# Patient Record
Sex: FEMALE | Race: BLACK OR AFRICAN AMERICAN | NOT HISPANIC OR LATINO | Employment: UNEMPLOYED | URBAN - METROPOLITAN AREA
[De-identification: names, ages, dates, MRNs, and addresses within clinical notes are randomized per-mention and may not be internally consistent; named-entity substitution may affect disease eponyms.]

---

## 2017-04-21 ENCOUNTER — LAB VISIT (OUTPATIENT)
Dept: LAB | Facility: HOSPITAL | Age: 37
End: 2017-04-21
Attending: FAMILY MEDICINE
Payer: MEDICAID

## 2017-04-21 ENCOUNTER — OFFICE VISIT (OUTPATIENT)
Dept: FAMILY MEDICINE | Facility: HOSPITAL | Age: 37
End: 2017-04-21
Payer: MEDICAID

## 2017-04-21 VITALS
HEART RATE: 61 BPM | BODY MASS INDEX: 41.95 KG/M2 | DIASTOLIC BLOOD PRESSURE: 75 MMHG | WEIGHT: 293 LBS | HEIGHT: 70 IN | SYSTOLIC BLOOD PRESSURE: 137 MMHG

## 2017-04-21 DIAGNOSIS — I10 ESSENTIAL HYPERTENSION: ICD-10-CM

## 2017-04-21 DIAGNOSIS — Z00.00 HEALTH CARE MAINTENANCE: ICD-10-CM

## 2017-04-21 DIAGNOSIS — I10 ESSENTIAL HYPERTENSION: Primary | ICD-10-CM

## 2017-04-21 LAB
ALBUMIN SERPL BCP-MCNC: 3.7 G/DL
ALP SERPL-CCNC: 69 U/L
ALT SERPL W/O P-5'-P-CCNC: 10 U/L
ANION GAP SERPL CALC-SCNC: 9 MMOL/L
AST SERPL-CCNC: 9 U/L
BASOPHILS # BLD AUTO: 0.02 K/UL
BASOPHILS NFR BLD: 0.2 %
BILIRUB SERPL-MCNC: 0.4 MG/DL
BUN SERPL-MCNC: 11 MG/DL
CALCIUM SERPL-MCNC: 9.5 MG/DL
CHLORIDE SERPL-SCNC: 107 MMOL/L
CHOLEST/HDLC SERPL: 5.4 {RATIO}
CO2 SERPL-SCNC: 22 MMOL/L
CREAT SERPL-MCNC: 0.9 MG/DL
DIFFERENTIAL METHOD: ABNORMAL
EOSINOPHIL # BLD AUTO: 0.1 K/UL
EOSINOPHIL NFR BLD: 1.2 %
ERYTHROCYTE [DISTWIDTH] IN BLOOD BY AUTOMATED COUNT: 14 %
EST. GFR  (AFRICAN AMERICAN): >60 ML/MIN/1.73 M^2
EST. GFR  (NON AFRICAN AMERICAN): >60 ML/MIN/1.73 M^2
GLUCOSE SERPL-MCNC: 91 MG/DL
HCT VFR BLD AUTO: 34.6 %
HDL/CHOLESTEROL RATIO: 18.5 %
HDLC SERPL-MCNC: 233 MG/DL
HDLC SERPL-MCNC: 43 MG/DL
HGB BLD-MCNC: 11.4 G/DL
LDLC SERPL CALC-MCNC: 158.2 MG/DL
LYMPHOCYTES # BLD AUTO: 3.3 K/UL
LYMPHOCYTES NFR BLD: 38.9 %
MCH RBC QN AUTO: 30.3 PG
MCHC RBC AUTO-ENTMCNC: 32.9 %
MCV RBC AUTO: 92 FL
MONOCYTES # BLD AUTO: 0.6 K/UL
MONOCYTES NFR BLD: 7.4 %
NEUTROPHILS # BLD AUTO: 4.4 K/UL
NEUTROPHILS NFR BLD: 52.2 %
NONHDLC SERPL-MCNC: 190 MG/DL
PLATELET # BLD AUTO: 325 K/UL
PMV BLD AUTO: 10.2 FL
POTASSIUM SERPL-SCNC: 3.8 MMOL/L
PROT SERPL-MCNC: 7.7 G/DL
RBC # BLD AUTO: 3.76 M/UL
SODIUM SERPL-SCNC: 138 MMOL/L
TRIGL SERPL-MCNC: 159 MG/DL
TSH SERPL DL<=0.005 MIU/L-ACNC: 1.7 UIU/ML
WBC # BLD AUTO: 8.36 K/UL

## 2017-04-21 PROCEDURE — 99213 OFFICE O/P EST LOW 20 MIN: CPT | Performed by: FAMILY MEDICINE

## 2017-04-21 PROCEDURE — 80053 COMPREHEN METABOLIC PANEL: CPT

## 2017-04-21 PROCEDURE — 83036 HEMOGLOBIN GLYCOSYLATED A1C: CPT

## 2017-04-21 PROCEDURE — 36415 COLL VENOUS BLD VENIPUNCTURE: CPT

## 2017-04-21 PROCEDURE — 80061 LIPID PANEL: CPT

## 2017-04-21 PROCEDURE — 85025 COMPLETE CBC W/AUTO DIFF WBC: CPT

## 2017-04-21 PROCEDURE — 84443 ASSAY THYROID STIM HORMONE: CPT

## 2017-04-21 RX ORDER — LISINOPRIL 20 MG/1
20 TABLET ORAL DAILY
Qty: 30 TABLET | Refills: 11 | Status: SHIPPED | OUTPATIENT
Start: 2017-04-21 | End: 2018-08-08 | Stop reason: SDUPTHER

## 2017-04-21 RX ORDER — HYDROCHLOROTHIAZIDE 12.5 MG/1
12.5 TABLET ORAL DAILY
Qty: 30 TABLET | Refills: 11 | Status: SHIPPED | OUTPATIENT
Start: 2017-04-21 | End: 2018-08-08 | Stop reason: SDUPTHER

## 2017-04-21 RX ORDER — AMLODIPINE BESYLATE 5 MG/1
5 TABLET ORAL DAILY
Qty: 30 TABLET | Refills: 11 | Status: SHIPPED | OUTPATIENT
Start: 2017-04-21 | End: 2018-08-08 | Stop reason: SDUPTHER

## 2017-04-21 NOTE — PROGRESS NOTES
Subjective:       Patient ID: Dirk Issa is a 37 y.o. female.    Chief Complaint: Follow-up    HPI   Pt is a obese 36 yo F with hx of HTN who presents to the clinic for a refills on her BP meds. Pt states that she ran out of all her meds this morning but is compliant with all her medications otherwise. She denies any other issues of f/c/cp/sob and abd pain.     Review of Systems   Constitutional: Negative for chills, fatigue and fever.   HENT: Negative for sneezing and sore throat.    Eyes: Negative for pain.   Respiratory: Negative for cough, chest tightness, shortness of breath and wheezing.    Cardiovascular: Negative for chest pain and palpitations.   Gastrointestinal: Negative for abdominal pain, constipation, diarrhea, nausea and vomiting.   Genitourinary: Negative for dysuria.   Musculoskeletal: Negative for back pain.   Skin: Negative for rash.   Neurological: Negative for headaches.   Psychiatric/Behavioral: Negative for behavioral problems.       Objective:      Vitals:    04/21/17 1106   BP: 137/75   Pulse: 61     Physical Exam   Constitutional: She is oriented to person, place, and time. She appears well-developed and well-nourished.   Obese.    HENT:   Head: Normocephalic and atraumatic.   Eyes: Conjunctivae are normal. Pupils are equal, round, and reactive to light.   Neck: Normal range of motion.   Cardiovascular: Normal rate, normal heart sounds and intact distal pulses.    Pulmonary/Chest: Effort normal and breath sounds normal. She has no wheezes. She exhibits no tenderness.   Abdominal: Soft. Bowel sounds are normal. There is no tenderness.   Neurological: She is alert and oriented to person, place, and time.   Skin: Skin is warm.   Psychiatric: She has a normal mood and affect.       Assessment:       1. Essential hypertension    2. Health care maintenance        Plan:       Essential hypertension  -     amlodipine (NORVASC) 5 MG tablet; Take 1 tablet (5 mg total) by mouth once daily.   Dispense: 30 tablet; Refill: 11  -     hydrochlorothiazide (HYDRODIURIL) 12.5 MG Tab; Take 1 tablet (12.5 mg total) by mouth once daily.  Dispense: 30 tablet; Refill: 11  -     lisinopril (PRINIVIL,ZESTRIL) 20 MG tablet; Take 1 tablet (20 mg total) by mouth once daily.  Dispense: 30 tablet; Refill: 11  -     CBC auto differential; Future; Expected date: 4/21/17  -     Comprehensive metabolic panel; Future; Expected date: 4/21/17  -     TSH; Future; Expected date: 4/21/17  -     Hemoglobin A1c; Future; Expected date: 4/21/17  -     Lipid panel; Future; Expected date: 4/21/17    Health care maintenance  -     CBC auto differential; Future; Expected date: 4/21/17  -     Comprehensive metabolic panel; Future; Expected date: 4/21/17  -     TSH; Future; Expected date: 4/21/17  -     Hemoglobin A1c; Future; Expected date: 4/21/17  -     Lipid panel; Future; Expected date: 4/21/17  - Pap smear due next yr in 2018      Return in about 3 months (around 7/21/2017).

## 2017-04-22 LAB
ESTIMATED AVG GLUCOSE: 117 MG/DL
HBA1C MFR BLD HPLC: 5.7 %

## 2017-04-24 ENCOUNTER — DOCUMENTATION ONLY (OUTPATIENT)
Dept: FAMILY MEDICINE | Facility: HOSPITAL | Age: 37
End: 2017-04-24

## 2017-04-24 NOTE — PROGRESS NOTES
Pt called and new labs reviewed. LP elevated. Will advise on diet and exercise for now. Will plan to recheck. All questions were answered.     Denis Hidalgo

## 2017-07-09 ENCOUNTER — HOSPITAL ENCOUNTER (EMERGENCY)
Facility: HOSPITAL | Age: 37
Discharge: HOME | End: 2017-07-09
Attending: EMERGENCY MEDICINE
Payer: MEDICAID

## 2017-07-09 VITALS
DIASTOLIC BLOOD PRESSURE: 96 MMHG | HEART RATE: 66 BPM | TEMPERATURE: 98 F | SYSTOLIC BLOOD PRESSURE: 148 MMHG | BODY MASS INDEX: 41.95 KG/M2 | HEIGHT: 70 IN | OXYGEN SATURATION: 99 % | WEIGHT: 293 LBS | RESPIRATION RATE: 19 BRPM

## 2017-07-09 DIAGNOSIS — R55 NEAR SYNCOPE: ICD-10-CM

## 2017-07-09 LAB
ALBUMIN SERPL BCP-MCNC: 3.7 G/DL
ALP SERPL-CCNC: 77 U/L
ALT SERPL W/O P-5'-P-CCNC: 9 U/L
ANION GAP SERPL CALC-SCNC: 10 MMOL/L
AST SERPL-CCNC: 12 U/L
BASOPHILS # BLD AUTO: 0.03 K/UL
BASOPHILS NFR BLD: 0.5 %
BILIRUB SERPL-MCNC: 0.3 MG/DL
BUN SERPL-MCNC: 9 MG/DL
CALCIUM SERPL-MCNC: 9.5 MG/DL
CHLORIDE SERPL-SCNC: 107 MMOL/L
CO2 SERPL-SCNC: 21 MMOL/L
CREAT SERPL-MCNC: 0.9 MG/DL
DIFFERENTIAL METHOD: ABNORMAL
EOSINOPHIL # BLD AUTO: 0.1 K/UL
EOSINOPHIL NFR BLD: 1.4 %
ERYTHROCYTE [DISTWIDTH] IN BLOOD BY AUTOMATED COUNT: 13.5 %
EST. GFR  (AFRICAN AMERICAN): >60 ML/MIN/1.73 M^2
EST. GFR  (NON AFRICAN AMERICAN): >60 ML/MIN/1.73 M^2
GLUCOSE SERPL-MCNC: 99 MG/DL
HCT VFR BLD AUTO: 34 %
HGB BLD-MCNC: 11 G/DL
LYMPHOCYTES # BLD AUTO: 2.5 K/UL
LYMPHOCYTES NFR BLD: 39.2 %
MAGNESIUM SERPL-MCNC: 2.1 MG/DL
MCH RBC QN AUTO: 30.1 PG
MCHC RBC AUTO-ENTMCNC: 32.4 %
MCV RBC AUTO: 93 FL
MONOCYTES # BLD AUTO: 0.5 K/UL
MONOCYTES NFR BLD: 8 %
NEUTROPHILS # BLD AUTO: 3.2 K/UL
NEUTROPHILS NFR BLD: 50.7 %
PLATELET # BLD AUTO: 284 K/UL
PMV BLD AUTO: 9.9 FL
POTASSIUM SERPL-SCNC: 4.2 MMOL/L
PROT SERPL-MCNC: 7.1 G/DL
RBC # BLD AUTO: 3.66 M/UL
SODIUM SERPL-SCNC: 138 MMOL/L
TROPONIN I SERPL DL<=0.01 NG/ML-MCNC: 0.03 NG/ML
WBC # BLD AUTO: 6.36 K/UL

## 2017-07-09 PROCEDURE — 99284 EMERGENCY DEPT VISIT MOD MDM: CPT

## 2017-07-09 PROCEDURE — 93005 ELECTROCARDIOGRAM TRACING: CPT

## 2017-07-09 PROCEDURE — 84484 ASSAY OF TROPONIN QUANT: CPT

## 2017-07-09 PROCEDURE — 85025 COMPLETE CBC W/AUTO DIFF WBC: CPT

## 2017-07-09 PROCEDURE — 80053 COMPREHEN METABOLIC PANEL: CPT

## 2017-07-09 PROCEDURE — 83735 ASSAY OF MAGNESIUM: CPT

## 2017-07-09 NOTE — ED NOTES
Pt refusing urine specimen at this time and states she does not need a UPT. Pt also on menstrual cycle. Sherin and MD notified.

## 2017-07-09 NOTE — ED PROVIDER NOTES
Encounter Date: 7/9/2017       History     Chief Complaint   Patient presents with    Altered Mental Status     pt had near syncopal episode pta. denies +nausea denies vomiting, no chest pain but palpitations.     Patient is a 37-year-old female who said she felt weak and lightheaded at home today.  She says she felt as though she was going to pass out, but did not.  She has a history of hypertension but did not take her medications morning until after her symptoms.  She also felt palpitations but had no chest pain or shortness of breath.  No nausea or vomiting.  She has a mild headache but says this is normal for her when she is on her menses.      The history is provided by the patient.     Review of patient's allergies indicates:  No Known Allergies  Past Medical History:   Diagnosis Date    Hypertension      History reviewed. No pertinent surgical history.  Family History   Problem Relation Age of Onset    Hypertension Maternal Grandmother     Hypertension Maternal Grandfather      Social History   Substance Use Topics    Smoking status: Never Smoker    Smokeless tobacco: Never Used    Alcohol use No     Review of Systems   Constitutional: Negative for chills and fever.   HENT: Negative for congestion.    Eyes: Negative for visual disturbance.   Respiratory: Negative for chest tightness and shortness of breath.    Cardiovascular: Positive for palpitations. Negative for chest pain.   Gastrointestinal: Negative for abdominal pain, diarrhea and vomiting.   Neurological: Positive for light-headedness. Negative for syncope.   All other systems reviewed and are negative.      Physical Exam     Initial Vitals [07/09/17 1511]   BP Pulse Resp Temp SpO2   (!) 191/89 70 18 98 °F (36.7 °C) 98 %      MAP       123         Physical Exam    Nursing note and vitals reviewed.  Constitutional: She appears well-developed and well-nourished.   HENT:   Head: Normocephalic and atraumatic.   Eyes: Conjunctivae and EOM are  "normal. Pupils are equal, round, and reactive to light.   Neck: Normal range of motion. Neck supple.   Cardiovascular: Normal rate and regular rhythm.   Pulmonary/Chest: Breath sounds normal.   Abdominal: Soft. There is no tenderness. There is no rebound and no guarding.   Musculoskeletal: Normal range of motion.   Neurological: She is alert and oriented to person, place, and time. She has normal strength.   Skin: Skin is warm and dry.   Psychiatric: She has a normal mood and affect.         ED Course   Procedures  Labs Reviewed   CBC W/ AUTO DIFFERENTIAL - Abnormal; Notable for the following:        Result Value    RBC 3.66 (*)     Hemoglobin 11.0 (*)     Hematocrit 34.0 (*)     All other components within normal limits   COMPREHENSIVE METABOLIC PANEL - Abnormal; Notable for the following:     CO2 21 (*)     ALT 9 (*)     All other components within normal limits   TROPONIN I - Abnormal; Notable for the following:     Troponin I 0.029 (*)     All other components within normal limits   MAGNESIUM     Imaging Results          X-Ray Chest 1 View (Final result)  Result time 07/09/17 15:55:32    Final result by Kalani Zelaya MD (07/09/17 15:55:32)                 Impression:     No detrimental change since 3/16/16.      Electronically signed by: KALANI ZELAYA MD  Date:     07/09/17  Time:    15:55              Narrative:    XR CHEST 1 VIEW.  Clinical History provided for exam:"near syncope". The cardiac silhouette is  enlarged.  Pulmonary vascularity is not increased.  The lungs are free of lobar consolidation and alveolar edema.  There is no large pleural effusion or pneumothorax.  Visualized osseous structures are stable.                              EKG Readings: (Independently Interpreted)   Rhythm: Normal Sinus Rhythm. Heart Rate: 64. ST Segments: Normal ST Segments. T Waves Flipped: V3, V4, V5 and V6.          Medical Decision Making:   Clinical Tests:   Lab Tests: Ordered and Reviewed  Radiological Study: " Ordered and Reviewed  ED Management:  37-year-old female who had a near syncopal episode at home.  Workup here in the ED is unremarkable.  She has a very mild elevation of her troponin which, in reviewing prior lab work, appears to be chronic.  Her initial systolic blood pressures in the 190s.  Patient had taken her blood pressure medicine prior to arrival.  Pressure was noted to be reduced into the 140s.  She has remained asymptomatic while in the ED.  She was discharged in stable condition to follow-up with her primary physician as soon as able for recheck and further treatment as warranted.  She'll also return here for any returning symptoms occurring prior to follow-up.                   ED Course     Clinical Impression:   The encounter diagnosis was Near syncope.                           Pasha Mccormick MD  07/09/17 1584

## 2017-07-09 NOTE — ED NOTES
"Pt presents to ED with c/o near syncope a few hours pta. Pt states she did not take her BP meds this morning bc she felt bad. She was not doing any out of the ordinary and felt dizzy and weak. Pt said her mom told her to drink 2 "shots" of vinegar and starting feeling worse afterwards. Pt also c/o mild headache but states that she always gets them on her menstrual cycle. Pt AAO. NAD noted. Placed on cardiac monitor, bp cuff and continuous pulse ox.   "

## 2018-08-08 ENCOUNTER — OFFICE VISIT (OUTPATIENT)
Dept: FAMILY MEDICINE | Facility: HOSPITAL | Age: 38
End: 2018-08-08
Attending: FAMILY MEDICINE
Payer: MEDICAID

## 2018-08-08 VITALS
DIASTOLIC BLOOD PRESSURE: 116 MMHG | BODY MASS INDEX: 44.41 KG/M2 | SYSTOLIC BLOOD PRESSURE: 197 MMHG | HEART RATE: 74 BPM | HEIGHT: 68 IN | WEIGHT: 293 LBS

## 2018-08-08 DIAGNOSIS — I16.0 HYPERTENSIVE URGENCY: Primary | ICD-10-CM

## 2018-08-08 DIAGNOSIS — I10 ESSENTIAL HYPERTENSION: ICD-10-CM

## 2018-08-08 PROCEDURE — 99213 OFFICE O/P EST LOW 20 MIN: CPT | Performed by: STUDENT IN AN ORGANIZED HEALTH CARE EDUCATION/TRAINING PROGRAM

## 2018-08-08 RX ORDER — HYDROCHLOROTHIAZIDE 12.5 MG/1
12.5 TABLET ORAL DAILY
Qty: 30 TABLET | Refills: 11 | Status: SHIPPED | OUTPATIENT
Start: 2018-08-08 | End: 2019-10-15 | Stop reason: SDUPTHER

## 2018-08-08 RX ORDER — LISINOPRIL 20 MG/1
20 TABLET ORAL DAILY
Qty: 30 TABLET | Refills: 11 | Status: SHIPPED | OUTPATIENT
Start: 2018-08-08 | End: 2019-10-15 | Stop reason: SDUPTHER

## 2018-08-08 RX ORDER — AMLODIPINE BESYLATE 5 MG/1
5 TABLET ORAL DAILY
Qty: 30 TABLET | Refills: 11 | Status: SHIPPED | OUTPATIENT
Start: 2018-08-08 | End: 2019-10-15 | Stop reason: SDUPTHER

## 2018-08-08 NOTE — PROGRESS NOTES
Subjective:       Patient ID: Dirk Issa is a 38 y.o. female.    Chief Complaint: Medication Refill and Hypertension    HPI   Patient states that she has last been in clinic one year ago for management of her blood pressure. Patient was prescribed HCTZ 12.5mg, Amlodipine 10mg and Lisinopril 20mg. The patient states that she does not monitor her blood pressure at home. The most recent blood pressure recorded within the last year that was near her baseline on medication was 137/75 in 4/2017. Patient's blood pressure in clinic today is 197/116, however she is asymptomatic. Denies any HA, n/v, changes in vision, fevers, chills, chest pain, SOB, abdominal pain, changes in urinary or bowel habits, weakness or numbness.       Review of Systems  as per subjective  Objective:      Vitals:    08/08/18 1535   BP: (!) 197/116   Pulse: 74     Physical Exam   Constitutional: She is oriented to person, place, and time. She appears well-developed.   HENT:   Head: Normocephalic and atraumatic.   Allergic salute present.    Eyes: EOM are normal. Pupils are equal, round, and reactive to light.   Neck: Normal range of motion. Neck supple.   Cardiovascular: Normal rate and regular rhythm.    Pulmonary/Chest: Effort normal and breath sounds normal.   Abdominal: Soft. Bowel sounds are normal.   Neurological: She is alert and oriented to person, place, and time.   Skin: Capillary refill takes less than 2 seconds.       Assessment:    1. HTN urgency - /116. Asymptomatic.       Plan:       Hypertensive urgency  -     Comprehensive metabolic panel; Future; Expected date: 08/08/2018  -     CBC auto differential; Future; Expected date: 08/08/2018  -     Lipid panel; Future; Expected date: 08/08/2018  -     Microalbumin/creatinine urine ratio  -     Hemoglobin A1c; Future; Expected date: 08/08/2018  -     TSH; Future; Expected date: 08/08/2018  -     lisinopril (PRINIVIL,ZESTRIL) 20 MG tablet; Take 1 tablet (20 mg total) by mouth  once daily.  Dispense: 30 tablet; Refill: 11  -     hydroCHLOROthiazide (HYDRODIURIL) 12.5 MG Tab; Take 1 tablet (12.5 mg total) by mouth once daily.  Dispense: 30 tablet; Refill: 11  -     amLODIPine (NORVASC) 5 MG tablet; Take 1 tablet (5 mg total) by mouth once daily.  Dispense: 30 tablet; Refill: 11      Follow-up in about 1 week (around 8/15/2018) for HTN Management and Lab F/u .      Ethan Agudelo MD  Family Medicine, PGY-1

## 2018-08-09 NOTE — PROGRESS NOTES
I assume primary medical responsibility for this patient, I have seen the patient, reviewed the case history, findings, diagnosis and treatment plan with the resident and agree that the care is reasonable and necessary.  Becki Phelps  8/9/2018

## 2019-10-15 ENCOUNTER — OFFICE VISIT (OUTPATIENT)
Dept: FAMILY MEDICINE | Facility: HOSPITAL | Age: 39
End: 2019-10-15
Attending: SPECIALIST
Payer: MEDICAID

## 2019-10-15 ENCOUNTER — LAB VISIT (OUTPATIENT)
Dept: LAB | Facility: HOSPITAL | Age: 39
End: 2019-10-15
Attending: SPECIALIST
Payer: MEDICAID

## 2019-10-15 VITALS
HEIGHT: 68 IN | WEIGHT: 293 LBS | DIASTOLIC BLOOD PRESSURE: 103 MMHG | BODY MASS INDEX: 44.41 KG/M2 | SYSTOLIC BLOOD PRESSURE: 184 MMHG | HEART RATE: 81 BPM

## 2019-10-15 DIAGNOSIS — I10 ESSENTIAL HYPERTENSION: Primary | ICD-10-CM

## 2019-10-15 DIAGNOSIS — I10 ESSENTIAL HYPERTENSION: ICD-10-CM

## 2019-10-15 LAB
ALBUMIN SERPL BCP-MCNC: 3.7 G/DL (ref 3.5–5.2)
ALP SERPL-CCNC: 78 U/L (ref 55–135)
ALT SERPL W/O P-5'-P-CCNC: 12 U/L (ref 10–44)
ANION GAP SERPL CALC-SCNC: 8 MMOL/L (ref 8–16)
AST SERPL-CCNC: 12 U/L (ref 10–40)
BASOPHILS # BLD AUTO: 0.02 K/UL (ref 0–0.2)
BASOPHILS NFR BLD: 0.3 % (ref 0–1.9)
BILIRUB SERPL-MCNC: 0.3 MG/DL (ref 0.1–1)
BUN SERPL-MCNC: 8 MG/DL (ref 6–20)
CALCIUM SERPL-MCNC: 9.6 MG/DL (ref 8.7–10.5)
CHLORIDE SERPL-SCNC: 107 MMOL/L (ref 95–110)
CO2 SERPL-SCNC: 25 MMOL/L (ref 23–29)
CREAT SERPL-MCNC: 0.8 MG/DL (ref 0.5–1.4)
DIFFERENTIAL METHOD: ABNORMAL
EOSINOPHIL # BLD AUTO: 0.1 K/UL (ref 0–0.5)
EOSINOPHIL NFR BLD: 1.5 % (ref 0–8)
ERYTHROCYTE [DISTWIDTH] IN BLOOD BY AUTOMATED COUNT: 13.9 % (ref 11.5–14.5)
EST. GFR  (AFRICAN AMERICAN): >60 ML/MIN/1.73 M^2
EST. GFR  (NON AFRICAN AMERICAN): >60 ML/MIN/1.73 M^2
ESTIMATED AVG GLUCOSE: 117 MG/DL (ref 68–131)
GLUCOSE SERPL-MCNC: 84 MG/DL (ref 70–110)
HBA1C MFR BLD HPLC: 5.7 % (ref 4–5.6)
HCT VFR BLD AUTO: 34.9 % (ref 37–48.5)
HGB BLD-MCNC: 11.1 G/DL (ref 12–16)
LYMPHOCYTES # BLD AUTO: 2.9 K/UL (ref 1–4.8)
LYMPHOCYTES NFR BLD: 39.6 % (ref 18–48)
MCH RBC QN AUTO: 30 PG (ref 27–31)
MCHC RBC AUTO-ENTMCNC: 31.8 G/DL (ref 32–36)
MCV RBC AUTO: 94 FL (ref 82–98)
MONOCYTES # BLD AUTO: 0.6 K/UL (ref 0.3–1)
MONOCYTES NFR BLD: 7.6 % (ref 4–15)
NEUTROPHILS # BLD AUTO: 3.7 K/UL (ref 1.8–7.7)
NEUTROPHILS NFR BLD: 51 % (ref 38–73)
PLATELET # BLD AUTO: 302 K/UL (ref 150–350)
PMV BLD AUTO: 10.3 FL (ref 9.2–12.9)
POTASSIUM SERPL-SCNC: 3.9 MMOL/L (ref 3.5–5.1)
PROT SERPL-MCNC: 7 G/DL (ref 6–8.4)
RBC # BLD AUTO: 3.7 M/UL (ref 4–5.4)
SODIUM SERPL-SCNC: 140 MMOL/L (ref 136–145)
WBC # BLD AUTO: 7.23 K/UL (ref 3.9–12.7)

## 2019-10-15 PROCEDURE — 80053 COMPREHEN METABOLIC PANEL: CPT

## 2019-10-15 PROCEDURE — 83036 HEMOGLOBIN GLYCOSYLATED A1C: CPT

## 2019-10-15 PROCEDURE — 36415 COLL VENOUS BLD VENIPUNCTURE: CPT

## 2019-10-15 PROCEDURE — 99213 OFFICE O/P EST LOW 20 MIN: CPT | Performed by: STUDENT IN AN ORGANIZED HEALTH CARE EDUCATION/TRAINING PROGRAM

## 2019-10-15 PROCEDURE — 85025 COMPLETE CBC W/AUTO DIFF WBC: CPT

## 2019-10-15 RX ORDER — AMLODIPINE, VALSARTAN AND HYDROCHLOROTHIAZIDE 5; 160; 25 MG/1; MG/1; MG/1
1 TABLET ORAL DAILY
Qty: 30 TABLET | Refills: 11 | Status: SHIPPED | OUTPATIENT
Start: 2019-10-15 | End: 2022-02-10

## 2019-10-15 RX ORDER — HYDROCHLOROTHIAZIDE 25 MG/1
25 TABLET ORAL DAILY
Qty: 30 TABLET | Refills: 11 | Status: SHIPPED | OUTPATIENT
Start: 2019-10-15 | End: 2020-12-28 | Stop reason: SDUPTHER

## 2019-10-15 RX ORDER — LISINOPRIL 20 MG/1
20 TABLET ORAL DAILY
Qty: 30 TABLET | Refills: 11 | Status: SHIPPED | OUTPATIENT
Start: 2019-10-15 | End: 2020-12-28 | Stop reason: SDUPTHER

## 2019-10-15 RX ORDER — ATORVASTATIN CALCIUM 20 MG/1
20 TABLET, FILM COATED ORAL DAILY
Qty: 90 TABLET | Refills: 3 | Status: SHIPPED | OUTPATIENT
Start: 2019-10-15 | End: 2022-02-10 | Stop reason: SDUPTHER

## 2019-10-15 RX ORDER — AMLODIPINE BESYLATE 10 MG/1
10 TABLET ORAL DAILY
Qty: 30 TABLET | Refills: 11 | Status: SHIPPED | OUTPATIENT
Start: 2019-10-15 | End: 2020-12-28 | Stop reason: SDUPTHER

## 2019-10-15 NOTE — PROGRESS NOTES
Subjective                                                                                                                                                                           Chief Complaint: out of meds    Dirk Issa is a 39 y.o. female who  has a past medical history of Hypertension. The patient presents to clinic for refill of medications and for further evaluation of HTN. Per chart review, it appears that pt frequently runs out of meds and comes to clinic at those times for refills. Despite high pressures, pt does not have any concerning symptoms such as headaches, SOB, CP, changes in vision.     HTN: Patient on regimen of amlodipine, HCTZ, and ACE, but does not appear to be on high doses.     Diet: The patient admits to frequently eating fast foods and fried foods. States that she drinks a lot of soft drinks as well.     Health Maintenance   Topic Date Due    TETANUS VACCINE  01/15/1997    Pap Smear with HPV Cotest  01/15/2000    Mammogram  01/15/2019    Lipid Panel  Completed        Review of Systems   Constitutional: Negative for activity change, chills, fatigue and fever.   HENT: Negative for congestion and sinus pressure.    Eyes: Negative for visual disturbance.   Respiratory: Negative for chest tightness and shortness of breath.    Cardiovascular: Negative for chest pain.   Gastrointestinal: Negative for abdominal distention, abdominal pain, diarrhea, nausea and vomiting.   Endocrine: Negative for polyuria.   Genitourinary: Negative for frequency.   Musculoskeletal: Negative for arthralgias and myalgias.   Skin: Negative for color change.   Neurological: Negative for dizziness, weakness and light-headedness.   Psychiatric/Behavioral: Negative for agitation and behavioral problems.        Past Medical History:   Diagnosis Date    Hypertension        No past surgical history on file.    Family History   Problem Relation Age of Onset    Hypertension Maternal Grandmother     Hypertension  "Maternal Grandfather        Social History     Tobacco Use    Smoking status: Never Smoker    Smokeless tobacco: Never Used   Substance Use Topics    Alcohol use: Yes     Comment: occ    Drug use: No       Review of patient's allergies indicates:  No Known Allergies     Objective                                                                                                                                                                             Vitals:    10/15/19 1007   BP: (!) 184/103   Pulse: 81   Weight: (!) 158.9 kg (350 lb 5 oz)   Height: 5' 8" (1.727 m)      Body mass index is 53.26 kg/m².    Physical Exam   Constitutional: She is oriented to person, place, and time. She appears well-developed and well-nourished. No distress.   obese   HENT:   Head: Normocephalic and atraumatic.   Eyes: Conjunctivae are normal.   Neck: Normal range of motion. Neck supple.   Cardiovascular: Normal rate and regular rhythm.   Pulmonary/Chest: Effort normal and breath sounds normal.   Abdominal: Soft. Bowel sounds are normal. She exhibits no distension. There is no tenderness.   Musculoskeletal: She exhibits no edema or tenderness.   Neurological: She is alert and oriented to person, place, and time.   Skin: Skin is warm.   Psychiatric: She has a normal mood and affect. Her behavior is normal.   Nursing note and vitals reviewed.      Laboratory:  Lab Results   Component Value Date    WBC 7.23 10/15/2019    HGB 11.1 (L) 10/15/2019    HCT 34.9 (L) 10/15/2019    MCV 94 10/15/2019     10/15/2019       Chemistry        Component Value Date/Time     10/15/2019 1115    K 3.9 10/15/2019 1115     10/15/2019 1115    CO2 25 10/15/2019 1115    BUN 8 10/15/2019 1115    CREATININE 0.8 10/15/2019 1115    GLU 84 10/15/2019 1115        Component Value Date/Time    CALCIUM 9.6 10/15/2019 1115    ALKPHOS 78 10/15/2019 1115    AST 12 10/15/2019 1115    ALT 12 10/15/2019 1115    BILITOT 0.3 10/15/2019 1115    ESTGFRAFRICA " >60 10/15/2019 1115    EGFRNONAA >60 10/15/2019 1115          Lab Results   Component Value Date    MG 2.1 07/09/2017    PHOS 3.7 03/18/2016     Lab Results   Component Value Date    CHOL 217 (H) 08/08/2018    HDL 39 (L) 08/08/2018    LDLCALC 152.4 08/08/2018    TRIG 128 08/08/2018     The 10-year ASCVD risk score (Gagan TELLEZ Jr., et al., 2013) is: 24.2%    Values used to calculate the score:      Age: 39 years      Sex: Female      Is Non- : Yes      Diabetic: No      Tobacco smoker: No      Systolic Blood Pressure: 184 mmHg      Is BP treated: Yes      HDL Cholesterol: 39 mg/dL      Total Cholesterol: 217 mg/dL    Lab Results   Component Value Date    TSH 1.085 08/08/2018     Lab Results   Component Value Date    HGBA1C 5.7 (H) 10/15/2019       Reviewed previous medical records and     Assessment/Plan                                                                                                                                                                Dirk Issa is a 39 y.o. female who presents to clinic with:    1. Essential hypertension         Problem List Items Addressed This Visit        Cardiac/Vascular    Hypertension - Primary    Relevant Medications    amLODIPine (NORVASC) 10 MG tablet    hydroCHLOROthiazide (HYDRODIURIL) 25 MG tablet    lisinopril (PRINIVIL,ZESTRIL) 20 MG tablet    atorvastatin (LIPITOR) 20 MG tablet    Other Relevant Orders    Hemoglobin A1c (Completed)    Comprehensive metabolic panel (Completed)    CBC auto differential (Completed)          Medication List with Changes/Refills   New Medications    AMLODIPINE-VALSARTAN-HCTHIAZID 5-160-25 MG TAB    Take 1 tablet by mouth once daily.    ATORVASTATIN (LIPITOR) 20 MG TABLET    Take 1 tablet (20 mg total) by mouth once daily.   Changed and/or Refilled Medications    Modified Medication Previous Medication    AMLODIPINE (NORVASC) 10 MG TABLET amLODIPine (NORVASC) 5 MG tablet       Take 1 tablet (10 mg  total) by mouth once daily.    Take 1 tablet (5 mg total) by mouth once daily.    HYDROCHLOROTHIAZIDE (HYDRODIURIL) 25 MG TABLET hydroCHLOROthiazide (HYDRODIURIL) 12.5 MG Tab       Take 1 tablet (25 mg total) by mouth once daily.    Take 1 tablet (12.5 mg total) by mouth once daily.    LISINOPRIL (PRINIVIL,ZESTRIL) 20 MG TABLET lisinopril (PRINIVIL,ZESTRIL) 20 MG tablet       Take 1 tablet (20 mg total) by mouth once daily.    Take 1 tablet (20 mg total) by mouth once daily.       Patient counseled about the importance of healthy dietary habits as well as routine physical activity and exercise for better health outcomes.    The patient's diagnosis, medications, and proper use of medications were dicussed. The importance of close follow up to discuss labs, modify medications, and monitor any potential side effects was also discussed. The patient was also informed of the importance of any future cancer screening.     Follow up in about 2 weeks (around 10/29/2019) for f/u of blood pressure and adjust meds as necessary. Follow up for further workup and reassessment or sooner as needed.    Patient expressed understanding after counseling regarding diagnosis and recommendations.        Liam Masterson MD  [facility] PGY-3

## 2020-12-28 ENCOUNTER — OFFICE VISIT (OUTPATIENT)
Dept: FAMILY MEDICINE | Facility: HOSPITAL | Age: 40
End: 2020-12-28
Payer: MEDICAID

## 2020-12-28 VITALS
HEART RATE: 69 BPM | WEIGHT: 293 LBS | DIASTOLIC BLOOD PRESSURE: 93 MMHG | SYSTOLIC BLOOD PRESSURE: 157 MMHG | HEIGHT: 68 IN | BODY MASS INDEX: 44.41 KG/M2

## 2020-12-28 DIAGNOSIS — I10 ESSENTIAL HYPERTENSION: ICD-10-CM

## 2020-12-28 DIAGNOSIS — Z00.00 HEALTHCARE MAINTENANCE: Primary | ICD-10-CM

## 2020-12-28 PROCEDURE — 99213 OFFICE O/P EST LOW 20 MIN: CPT | Performed by: STUDENT IN AN ORGANIZED HEALTH CARE EDUCATION/TRAINING PROGRAM

## 2020-12-28 RX ORDER — AMLODIPINE BESYLATE 10 MG/1
10 TABLET ORAL DAILY
Qty: 30 TABLET | Refills: 11 | Status: SHIPPED | OUTPATIENT
Start: 2020-12-28 | End: 2022-02-10 | Stop reason: SDUPTHER

## 2020-12-28 RX ORDER — LISINOPRIL 20 MG/1
20 TABLET ORAL DAILY
Qty: 30 TABLET | Refills: 11 | Status: SHIPPED | OUTPATIENT
Start: 2020-12-28 | End: 2022-02-10 | Stop reason: SDUPTHER

## 2020-12-28 RX ORDER — HYDROCHLOROTHIAZIDE 25 MG/1
25 TABLET ORAL DAILY
Qty: 30 TABLET | Refills: 11 | Status: SHIPPED | OUTPATIENT
Start: 2020-12-28 | End: 2022-02-10 | Stop reason: SDUPTHER

## 2020-12-28 NOTE — PROGRESS NOTES
Subjective:       Patient ID: Dirk Issa is a 40 y.o. female.    Chief Complaint: Medication Refill    41 yo woman with history of htn presents for routine HCM. Doing well, no complaints. She needs pap smear due in 2018, but requests to come back for it. BP well controlled on medication. She is up to date on HCM.     Counseled on diet and exercise. She is working to lose weight and eat better.     ROS otherwise negative.       Review of Systems   Constitutional: Negative for activity change, appetite change, fatigue and unexpected weight change.   HENT: Negative for congestion, hearing loss, postnasal drip, rhinorrhea and sinus pain.    Eyes: Negative for photophobia and visual disturbance.   Respiratory: Negative for apnea, cough, choking, chest tightness, shortness of breath and wheezing.    Cardiovascular: Negative for chest pain, palpitations and leg swelling.   Gastrointestinal: Negative for abdominal distention, abdominal pain, constipation, diarrhea, nausea and vomiting.   Endocrine: Negative for cold intolerance, heat intolerance and polyuria.   Genitourinary: Negative for difficulty urinating, flank pain, frequency and urgency.   Musculoskeletal: Negative for arthralgias, back pain and myalgias.   Skin: Negative for rash.   Neurological: Negative for dizziness, weakness, light-headedness, numbness and headaches.       Objective:      Vitals:    12/28/20 1024   BP: (!) 157/93   Pulse: 69     Physical Exam  Vitals signs and nursing note reviewed.   Constitutional:       General: She is not in acute distress.     Appearance: She is well-developed. She is not diaphoretic.   HENT:      Head: Normocephalic and atraumatic.      Mouth/Throat:      Pharynx: No oropharyngeal exudate.   Eyes:      General: No scleral icterus.     Conjunctiva/sclera: Conjunctivae normal.      Pupils: Pupils are equal, round, and reactive to light.   Neck:      Musculoskeletal: Normal range of motion and neck supple.       Thyroid: No thyromegaly.   Cardiovascular:      Rate and Rhythm: Normal rate and regular rhythm.      Heart sounds: Normal heart sounds. No murmur. No friction rub. No gallop.    Pulmonary:      Effort: Pulmonary effort is normal. No respiratory distress.      Breath sounds: Normal breath sounds. No wheezing.   Chest:      Chest wall: No tenderness.   Abdominal:      General: Bowel sounds are normal. There is no distension.      Palpations: Abdomen is soft. There is no mass.      Tenderness: There is no abdominal tenderness.      Hernia: No hernia is present.   Musculoskeletal: Normal range of motion.         General: No tenderness or deformity.   Lymphadenopathy:      Cervical: No cervical adenopathy.   Skin:     General: Skin is warm and dry.      Capillary Refill: Capillary refill takes less than 2 seconds.      Findings: No erythema.   Neurological:      Mental Status: She is alert and oriented to person, place, and time.      Cranial Nerves: No cranial nerve deficit.   Psychiatric:         Behavior: Behavior normal.         Assessment:       1. Healthcare maintenance    2. Essential hypertension        Plan:       Healthcare maintenance  -     CBC Auto Differential; Future; Expected date: 12/28/2020  -     Hemoglobin A1C; Future; Expected date: 12/28/2020  -     Lipid Panel; Future; Expected date: 12/28/2020  -     Comprehensive Metabolic Panel; Future; Expected date: 12/28/2020    Essential hypertension  -     lisinopriL (PRINIVIL,ZESTRIL) 20 MG tablet; Take 1 tablet (20 mg total) by mouth once daily.  Dispense: 30 tablet; Refill: 11  -     amLODIPine (NORVASC) 10 MG tablet; Take 1 tablet (10 mg total) by mouth once daily.  Dispense: 30 tablet; Refill: 11  -     hydroCHLOROthiazide (HYDRODIURIL) 25 MG tablet; Take 1 tablet (25 mg total) by mouth once daily.  Dispense: 30 tablet; Refill: 11  -     CBC Auto Differential; Future; Expected date: 12/28/2020  -     Hemoglobin A1C; Future; Expected date:  12/28/2020  -     Lipid Panel; Future; Expected date: 12/28/2020  -     Comprehensive Metabolic Panel; Future; Expected date: 12/28/2020      Follow up in about 4 weeks (around 1/25/2021).

## 2022-02-10 ENCOUNTER — OFFICE VISIT (OUTPATIENT)
Dept: FAMILY MEDICINE | Facility: HOSPITAL | Age: 42
End: 2022-02-10
Attending: FAMILY MEDICINE
Payer: MEDICAID

## 2022-02-10 VITALS
WEIGHT: 293 LBS | BODY MASS INDEX: 44.41 KG/M2 | HEIGHT: 68 IN | SYSTOLIC BLOOD PRESSURE: 141 MMHG | HEART RATE: 81 BPM | DIASTOLIC BLOOD PRESSURE: 79 MMHG

## 2022-02-10 DIAGNOSIS — E66.01 CLASS 3 SEVERE OBESITY WITHOUT SERIOUS COMORBIDITY WITH BODY MASS INDEX (BMI) OF 50.0 TO 59.9 IN ADULT, UNSPECIFIED OBESITY TYPE: ICD-10-CM

## 2022-02-10 DIAGNOSIS — I10 ESSENTIAL HYPERTENSION: ICD-10-CM

## 2022-02-10 DIAGNOSIS — Z12.31 ENCOUNTER FOR SCREENING MAMMOGRAM FOR MALIGNANT NEOPLASM OF BREAST: ICD-10-CM

## 2022-02-10 DIAGNOSIS — I10 PRIMARY HYPERTENSION: Primary | ICD-10-CM

## 2022-02-10 PROCEDURE — 99213 OFFICE O/P EST LOW 20 MIN: CPT | Performed by: STUDENT IN AN ORGANIZED HEALTH CARE EDUCATION/TRAINING PROGRAM

## 2022-02-10 RX ORDER — LISINOPRIL 20 MG/1
20 TABLET ORAL DAILY
Qty: 30 TABLET | Refills: 0 | Status: SHIPPED | OUTPATIENT
Start: 2022-02-10 | End: 2022-03-29 | Stop reason: SDUPTHER

## 2022-02-10 RX ORDER — AMLODIPINE BESYLATE 10 MG/1
10 TABLET ORAL DAILY
Qty: 30 TABLET | Refills: 0 | Status: SHIPPED | OUTPATIENT
Start: 2022-02-10 | End: 2022-03-29 | Stop reason: SDUPTHER

## 2022-02-10 RX ORDER — HYDROCHLOROTHIAZIDE 25 MG/1
25 TABLET ORAL DAILY
Qty: 30 TABLET | Refills: 0 | Status: SHIPPED | OUTPATIENT
Start: 2022-02-10 | End: 2022-03-29 | Stop reason: SDUPTHER

## 2022-02-10 RX ORDER — ATORVASTATIN CALCIUM 20 MG/1
20 TABLET, FILM COATED ORAL DAILY
Qty: 90 TABLET | Refills: 3 | Status: SHIPPED | OUTPATIENT
Start: 2022-02-10 | End: 2023-07-18 | Stop reason: SDUPTHER

## 2022-02-10 NOTE — PROGRESS NOTES
"Progress Note  [facility]      Chief Complaint:   Chief Complaint   Patient presents with    Medication Refill        Subjective:    Dirk Issa is a 42 y.o.female with a pertinent history of HTN who is here for med refills. She has no concerns today. Is out of meds, denies h/a, visual changes, CP/SOB or other associated symptoms. Has never had a mammogram. Neg FH for breast cancer.     Review of Systems    Review of Systems   Constitutional: Negative for chills, fever and weight loss.   Eyes: Negative for blurred vision.   Respiratory: Negative for cough, hemoptysis, sputum production and shortness of breath.    Cardiovascular: Negative for chest pain, palpitations, orthopnea, claudication and leg swelling.   Gastrointestinal: Negative for abdominal pain, blood in stool, constipation, diarrhea, heartburn, melena, nausea and vomiting.   Genitourinary: Negative for dysuria, hematuria and urgency.   Skin: Negative for itching and rash.   Neurological: Negative for dizziness, tingling, weakness and headaches.   Psychiatric/Behavioral: Negative for depression, substance abuse and suicidal ideas. The patient is not nervous/anxious and does not have insomnia.         Past medical, past surgical, social, and family history reviewed.    Objective:    BP (!) 141/79 (BP Location: Right arm)   Pulse 81   Ht 5' 8" (1.727 m)   Wt (!) 163.3 kg (360 lb 0.2 oz)   LMP 01/15/2022   BMI 54.74 kg/m²     Physical Exam:  Physical Exam  Vitals and nursing note reviewed.   Constitutional:       General: She is not in acute distress.     Appearance: She is obese. She is not diaphoretic.   HENT:      Head: Normocephalic and atraumatic.   Cardiovascular:      Rate and Rhythm: Normal rate and regular rhythm.      Pulses: Intact distal pulses.      Heart sounds: No murmur heard.  No friction rub. No gallop.    Pulmonary:      Effort: Pulmonary effort is normal. No respiratory distress.      Breath sounds: Normal breath " sounds. No wheezing or rales.   Chest:      Chest wall: No tenderness.   Abdominal:      General: Bowel sounds are normal. There is no distension.      Palpations: Abdomen is soft.      Tenderness: There is no abdominal tenderness.   Skin:     General: Skin is warm and dry.      Findings: No erythema.   Neurological:      Mental Status: She is alert and oriented to person, place, and time.   Psychiatric:         Mood and Affect: Mood and affect normal.         Cognition and Memory: Memory normal.         Judgment: Judgment normal.         Laboratory:    Reviewed labs and imaging.      Assessment Plan    1. Primary hypertension  Counseled on amb bp, will send in 1 month supply until labs are completed and renal function and electrolytes can be reviewed  - Comprehensive Metabolic Panel; Future    2. Class 3 severe obesity without serious comorbidity with body mass index (BMI) of 50.0 to 59.9 in adult, unspecified obesity type    - CBC Auto Differential; Future  - Hemoglobin A1C; Future  - Lipid Panel; Future    3. Encounter for screening mammogram for malignant neoplasm of breast    - Mammo Digital Screening Bilat w/ Axel; Future    4. Essential hypertension  - amLODIPine (NORVASC) 10 MG tablet; Take 1 tablet (10 mg total) by mouth once daily.  Dispense: 30 tablet; Refill: 0  - atorvastatin (LIPITOR) 20 MG tablet; Take 1 tablet (20 mg total) by mouth once daily.  Dispense: 90 tablet; Refill: 3  - hydroCHLOROthiazide (HYDRODIURIL) 25 MG tablet; Take 1 tablet (25 mg total) by mouth once daily.  Dispense: 30 tablet; Refill: 0  - lisinopriL (PRINIVIL,ZESTRIL) 20 MG tablet; Take 1 tablet (20 mg total) by mouth once daily.  Dispense: 30 tablet; Refill: 0        Follow Up:  2-4 weeks    The patient's diagnosis and medications were discussed.    I will review labs and notify patient with results either by mail or contact by phone.      02/10/2022  Frandy Esteban M.D.  [facility] PGY-3    *This note was dictated  using the [employer] word recognition program. There are word recognition mistakes that are occasionally missed on review.

## 2022-02-16 ENCOUNTER — LAB VISIT (OUTPATIENT)
Dept: LAB | Facility: HOSPITAL | Age: 42
End: 2022-02-16
Attending: STUDENT IN AN ORGANIZED HEALTH CARE EDUCATION/TRAINING PROGRAM
Payer: MEDICAID

## 2022-02-16 DIAGNOSIS — I10 PRIMARY HYPERTENSION: ICD-10-CM

## 2022-02-16 DIAGNOSIS — E66.01 CLASS 3 SEVERE OBESITY WITHOUT SERIOUS COMORBIDITY WITH BODY MASS INDEX (BMI) OF 50.0 TO 59.9 IN ADULT, UNSPECIFIED OBESITY TYPE: ICD-10-CM

## 2022-02-16 LAB
ALBUMIN SERPL BCP-MCNC: 3.6 G/DL (ref 3.5–5.2)
ALP SERPL-CCNC: 71 U/L (ref 55–135)
ALT SERPL W/O P-5'-P-CCNC: 16 U/L (ref 10–44)
ANION GAP SERPL CALC-SCNC: 7 MMOL/L (ref 8–16)
AST SERPL-CCNC: 12 U/L (ref 10–40)
BASOPHILS # BLD AUTO: 0.04 K/UL (ref 0–0.2)
BASOPHILS NFR BLD: 0.5 % (ref 0–1.9)
BILIRUB SERPL-MCNC: 0.3 MG/DL (ref 0.1–1)
BUN SERPL-MCNC: 12 MG/DL (ref 6–20)
CALCIUM SERPL-MCNC: 9.8 MG/DL (ref 8.7–10.5)
CHLORIDE SERPL-SCNC: 107 MMOL/L (ref 95–110)
CHOLEST SERPL-MCNC: 191 MG/DL (ref 120–199)
CHOLEST/HDLC SERPL: 4.5 {RATIO} (ref 2–5)
CO2 SERPL-SCNC: 24 MMOL/L (ref 23–29)
CREAT SERPL-MCNC: 0.9 MG/DL (ref 0.5–1.4)
DIFFERENTIAL METHOD: ABNORMAL
EOSINOPHIL # BLD AUTO: 0.1 K/UL (ref 0–0.5)
EOSINOPHIL NFR BLD: 1.7 % (ref 0–8)
ERYTHROCYTE [DISTWIDTH] IN BLOOD BY AUTOMATED COUNT: 14.1 % (ref 11.5–14.5)
EST. GFR  (AFRICAN AMERICAN): >60 ML/MIN/1.73 M^2
EST. GFR  (NON AFRICAN AMERICAN): >60 ML/MIN/1.73 M^2
ESTIMATED AVG GLUCOSE: 120 MG/DL (ref 68–131)
GLUCOSE SERPL-MCNC: 80 MG/DL (ref 70–110)
HBA1C MFR BLD: 5.8 % (ref 4–5.6)
HCT VFR BLD AUTO: 34.1 % (ref 37–48.5)
HDLC SERPL-MCNC: 42 MG/DL (ref 40–75)
HDLC SERPL: 22 % (ref 20–50)
HGB BLD-MCNC: 10.9 G/DL (ref 12–16)
IMM GRANULOCYTES # BLD AUTO: 0.03 K/UL (ref 0–0.04)
IMM GRANULOCYTES NFR BLD AUTO: 0.4 % (ref 0–0.5)
LDLC SERPL CALC-MCNC: 121.6 MG/DL (ref 63–159)
LYMPHOCYTES # BLD AUTO: 3 K/UL (ref 1–4.8)
LYMPHOCYTES NFR BLD: 35.4 % (ref 18–48)
MCH RBC QN AUTO: 30.8 PG (ref 27–31)
MCHC RBC AUTO-ENTMCNC: 32 G/DL (ref 32–36)
MCV RBC AUTO: 96 FL (ref 82–98)
MONOCYTES # BLD AUTO: 0.8 K/UL (ref 0.3–1)
MONOCYTES NFR BLD: 9.3 % (ref 4–15)
NEUTROPHILS # BLD AUTO: 4.4 K/UL (ref 1.8–7.7)
NEUTROPHILS NFR BLD: 52.7 % (ref 38–73)
NONHDLC SERPL-MCNC: 149 MG/DL
NRBC BLD-RTO: 0 /100 WBC
PLATELET # BLD AUTO: 290 K/UL (ref 150–450)
PMV BLD AUTO: 10 FL (ref 9.2–12.9)
POTASSIUM SERPL-SCNC: 4.1 MMOL/L (ref 3.5–5.1)
PROT SERPL-MCNC: 7.2 G/DL (ref 6–8.4)
RBC # BLD AUTO: 3.54 M/UL (ref 4–5.4)
SODIUM SERPL-SCNC: 138 MMOL/L (ref 136–145)
TRIGL SERPL-MCNC: 137 MG/DL (ref 30–150)
WBC # BLD AUTO: 8.4 K/UL (ref 3.9–12.7)

## 2022-02-16 PROCEDURE — 36415 COLL VENOUS BLD VENIPUNCTURE: CPT | Performed by: STUDENT IN AN ORGANIZED HEALTH CARE EDUCATION/TRAINING PROGRAM

## 2022-02-16 PROCEDURE — 83036 HEMOGLOBIN GLYCOSYLATED A1C: CPT | Performed by: STUDENT IN AN ORGANIZED HEALTH CARE EDUCATION/TRAINING PROGRAM

## 2022-02-16 PROCEDURE — 80053 COMPREHEN METABOLIC PANEL: CPT | Performed by: STUDENT IN AN ORGANIZED HEALTH CARE EDUCATION/TRAINING PROGRAM

## 2022-02-16 PROCEDURE — 80061 LIPID PANEL: CPT | Performed by: STUDENT IN AN ORGANIZED HEALTH CARE EDUCATION/TRAINING PROGRAM

## 2022-02-16 PROCEDURE — 85025 COMPLETE CBC W/AUTO DIFF WBC: CPT | Performed by: STUDENT IN AN ORGANIZED HEALTH CARE EDUCATION/TRAINING PROGRAM

## 2022-03-22 ENCOUNTER — HOSPITAL ENCOUNTER (OUTPATIENT)
Facility: HOSPITAL | Age: 42
Discharge: HOME | DRG: 194 | End: 2022-03-23
Attending: EMERGENCY MEDICINE | Admitting: FAMILY MEDICINE
Payer: MEDICAID

## 2022-03-22 DIAGNOSIS — J09.X1 INFLUENZA A WITH PNEUMONIA: ICD-10-CM

## 2022-03-22 DIAGNOSIS — J10.1 INFLUENZA A: Primary | ICD-10-CM

## 2022-03-22 DIAGNOSIS — A41.9 SEPSIS, DUE TO UNSPECIFIED ORGANISM, UNSPECIFIED WHETHER ACUTE ORGAN DYSFUNCTION PRESENT: ICD-10-CM

## 2022-03-22 DIAGNOSIS — R53.1 WEAKNESS: ICD-10-CM

## 2022-03-22 PROBLEM — E78.5 HLD (HYPERLIPIDEMIA): Status: ACTIVE | Noted: 2022-03-22

## 2022-03-22 PROBLEM — J18.9 PNEUMONIA: Status: ACTIVE | Noted: 2022-03-22

## 2022-03-22 LAB
ALBUMIN SERPL BCP-MCNC: 3.9 G/DL (ref 3.5–5.2)
ALP SERPL-CCNC: 68 U/L (ref 55–135)
ALT SERPL W/O P-5'-P-CCNC: 26 U/L (ref 10–44)
ANION GAP SERPL CALC-SCNC: 13 MMOL/L (ref 8–16)
AST SERPL-CCNC: 14 U/L (ref 10–40)
B-HCG UR QL: NEGATIVE
B-HCG UR QL: NEGATIVE
BASOPHILS # BLD AUTO: 0.02 K/UL (ref 0–0.2)
BASOPHILS NFR BLD: 0.1 % (ref 0–1.9)
BILIRUB SERPL-MCNC: 0.9 MG/DL (ref 0.1–1)
BILIRUB UR QL STRIP: NEGATIVE
BNP SERPL-MCNC: 16 PG/ML (ref 0–99)
BUN SERPL-MCNC: 15 MG/DL (ref 6–20)
CALCIUM SERPL-MCNC: 9.5 MG/DL (ref 8.7–10.5)
CHLORIDE SERPL-SCNC: 103 MMOL/L (ref 95–110)
CK SERPL-CCNC: 111 U/L (ref 20–180)
CLARITY UR: CLEAR
CO2 SERPL-SCNC: 20 MMOL/L (ref 23–29)
COLOR UR: COLORLESS
CREAT SERPL-MCNC: 1.2 MG/DL (ref 0.5–1.4)
CTP QC/QA: YES
DIFFERENTIAL METHOD: ABNORMAL
EOSINOPHIL # BLD AUTO: 0 K/UL (ref 0–0.5)
EOSINOPHIL NFR BLD: 0 % (ref 0–8)
ERYTHROCYTE [DISTWIDTH] IN BLOOD BY AUTOMATED COUNT: 13.6 % (ref 11.5–14.5)
EST. GFR  (AFRICAN AMERICAN): >60 ML/MIN/1.73 M^2
EST. GFR  (NON AFRICAN AMERICAN): 55 ML/MIN/1.73 M^2
GLUCOSE SERPL-MCNC: 119 MG/DL (ref 70–110)
GLUCOSE UR QL STRIP: NEGATIVE
HCT VFR BLD AUTO: 35.7 % (ref 37–48.5)
HGB BLD-MCNC: 11.4 G/DL (ref 12–16)
HGB UR QL STRIP: NEGATIVE
IMM GRANULOCYTES # BLD AUTO: 0.11 K/UL (ref 0–0.04)
IMM GRANULOCYTES NFR BLD AUTO: 0.8 % (ref 0–0.5)
INFLUENZA A, MOLECULAR: POSITIVE
INFLUENZA B, MOLECULAR: NEGATIVE
KETONES UR QL STRIP: NEGATIVE
LACTATE SERPL-SCNC: 2 MMOL/L (ref 0.5–2.2)
LACTATE SERPL-SCNC: 3.3 MMOL/L (ref 0.5–2.2)
LEUKOCYTE ESTERASE UR QL STRIP: NEGATIVE
LYMPHOCYTES # BLD AUTO: 0.9 K/UL (ref 1–4.8)
LYMPHOCYTES NFR BLD: 6.6 % (ref 18–48)
MCH RBC QN AUTO: 30.4 PG (ref 27–31)
MCHC RBC AUTO-ENTMCNC: 31.9 G/DL (ref 32–36)
MCV RBC AUTO: 95 FL (ref 82–98)
MONOCYTES # BLD AUTO: 0.4 K/UL (ref 0.3–1)
MONOCYTES NFR BLD: 3 % (ref 4–15)
NEUTROPHILS # BLD AUTO: 12.4 K/UL (ref 1.8–7.7)
NEUTROPHILS NFR BLD: 89.5 % (ref 38–73)
NITRITE UR QL STRIP: NEGATIVE
NRBC BLD-RTO: 0 /100 WBC
PH UR STRIP: 5 [PH] (ref 5–8)
PLATELET # BLD AUTO: 227 K/UL (ref 150–450)
PMV BLD AUTO: 10.7 FL (ref 9.2–12.9)
POTASSIUM SERPL-SCNC: 3.9 MMOL/L (ref 3.5–5.1)
PROCALCITONIN SERPL IA-MCNC: 3.73 NG/ML
PROT SERPL-MCNC: 7.2 G/DL (ref 6–8.4)
PROT UR QL STRIP: NEGATIVE
RBC # BLD AUTO: 3.75 M/UL (ref 4–5.4)
SARS-COV-2 RDRP RESP QL NAA+PROBE: NEGATIVE
SODIUM SERPL-SCNC: 136 MMOL/L (ref 136–145)
SP GR UR STRIP: 1.01 (ref 1–1.03)
SPECIMEN SOURCE: ABNORMAL
TROPONIN I SERPL DL<=0.01 NG/ML-MCNC: 0.01 NG/ML (ref 0–0.03)
TSH SERPL DL<=0.005 MIU/L-ACNC: 1.51 UIU/ML (ref 0.4–4)
URN SPEC COLLECT METH UR: ABNORMAL
UROBILINOGEN UR STRIP-ACNC: NEGATIVE EU/DL
WBC # BLD AUTO: 13.89 K/UL (ref 3.9–12.7)

## 2022-03-22 PROCEDURE — 83605 ASSAY OF LACTIC ACID: CPT | Mod: 91 | Performed by: EMERGENCY MEDICINE

## 2022-03-22 PROCEDURE — 96374 THER/PROPH/DIAG INJ IV PUSH: CPT

## 2022-03-22 PROCEDURE — 25000003 PHARM REV CODE 250: Performed by: EMERGENCY MEDICINE

## 2022-03-22 PROCEDURE — 93010 EKG 12-LEAD: ICD-10-PCS | Mod: ,,, | Performed by: INTERNAL MEDICINE

## 2022-03-22 PROCEDURE — 96366 THER/PROPH/DIAG IV INF ADDON: CPT

## 2022-03-22 PROCEDURE — 80053 COMPREHEN METABOLIC PANEL: CPT | Performed by: EMERGENCY MEDICINE

## 2022-03-22 PROCEDURE — 99285 EMERGENCY DEPT VISIT HI MDM: CPT | Mod: 25

## 2022-03-22 PROCEDURE — 96361 HYDRATE IV INFUSION ADD-ON: CPT

## 2022-03-22 PROCEDURE — 94799 UNLISTED PULMONARY SVC/PX: CPT

## 2022-03-22 PROCEDURE — 87502 INFLUENZA DNA AMP PROBE: CPT | Performed by: EMERGENCY MEDICINE

## 2022-03-22 PROCEDURE — 21400001 HC TELEMETRY ROOM

## 2022-03-22 PROCEDURE — 84145 PROCALCITONIN (PCT): CPT | Performed by: STUDENT IN AN ORGANIZED HEALTH CARE EDUCATION/TRAINING PROGRAM

## 2022-03-22 PROCEDURE — 96375 TX/PRO/DX INJ NEW DRUG ADDON: CPT

## 2022-03-22 PROCEDURE — 81025 URINE PREGNANCY TEST: CPT | Performed by: EMERGENCY MEDICINE

## 2022-03-22 PROCEDURE — 94760 N-INVAS EAR/PLS OXIMETRY 1: CPT

## 2022-03-22 PROCEDURE — 94761 N-INVAS EAR/PLS OXIMETRY MLT: CPT

## 2022-03-22 PROCEDURE — 93005 ELECTROCARDIOGRAM TRACING: CPT

## 2022-03-22 PROCEDURE — 84443 ASSAY THYROID STIM HORMONE: CPT | Performed by: STUDENT IN AN ORGANIZED HEALTH CARE EDUCATION/TRAINING PROGRAM

## 2022-03-22 PROCEDURE — 27000207 HC ISOLATION

## 2022-03-22 PROCEDURE — 93010 ELECTROCARDIOGRAM REPORT: CPT | Mod: ,,, | Performed by: INTERNAL MEDICINE

## 2022-03-22 PROCEDURE — 85025 COMPLETE CBC W/AUTO DIFF WBC: CPT | Performed by: EMERGENCY MEDICINE

## 2022-03-22 PROCEDURE — G0378 HOSPITAL OBSERVATION PER HR: HCPCS

## 2022-03-22 PROCEDURE — 96365 THER/PROPH/DIAG IV INF INIT: CPT

## 2022-03-22 PROCEDURE — 83880 ASSAY OF NATRIURETIC PEPTIDE: CPT | Performed by: EMERGENCY MEDICINE

## 2022-03-22 PROCEDURE — 84484 ASSAY OF TROPONIN QUANT: CPT | Performed by: EMERGENCY MEDICINE

## 2022-03-22 PROCEDURE — U0002 COVID-19 LAB TEST NON-CDC: HCPCS | Performed by: EMERGENCY MEDICINE

## 2022-03-22 PROCEDURE — 63600175 PHARM REV CODE 636 W HCPCS: Performed by: FAMILY MEDICINE

## 2022-03-22 PROCEDURE — 63600175 PHARM REV CODE 636 W HCPCS: Performed by: STUDENT IN AN ORGANIZED HEALTH CARE EDUCATION/TRAINING PROGRAM

## 2022-03-22 PROCEDURE — 96372 THER/PROPH/DIAG INJ SC/IM: CPT | Performed by: STUDENT IN AN ORGANIZED HEALTH CARE EDUCATION/TRAINING PROGRAM

## 2022-03-22 PROCEDURE — 96367 TX/PROPH/DG ADDL SEQ IV INF: CPT

## 2022-03-22 PROCEDURE — 82550 ASSAY OF CK (CPK): CPT | Performed by: EMERGENCY MEDICINE

## 2022-03-22 PROCEDURE — 83605 ASSAY OF LACTIC ACID: CPT | Performed by: EMERGENCY MEDICINE

## 2022-03-22 PROCEDURE — 81003 URINALYSIS AUTO W/O SCOPE: CPT | Performed by: EMERGENCY MEDICINE

## 2022-03-22 PROCEDURE — 63600175 PHARM REV CODE 636 W HCPCS: Performed by: EMERGENCY MEDICINE

## 2022-03-22 PROCEDURE — 25000003 PHARM REV CODE 250: Performed by: FAMILY MEDICINE

## 2022-03-22 PROCEDURE — 25000003 PHARM REV CODE 250: Performed by: STUDENT IN AN ORGANIZED HEALTH CARE EDUCATION/TRAINING PROGRAM

## 2022-03-22 PROCEDURE — 87040 BLOOD CULTURE FOR BACTERIA: CPT | Performed by: EMERGENCY MEDICINE

## 2022-03-22 PROCEDURE — 63700000 PHARM REV CODE 250 ALT 637 W/O HCPCS: Performed by: STUDENT IN AN ORGANIZED HEALTH CARE EDUCATION/TRAINING PROGRAM

## 2022-03-22 RX ORDER — METOCLOPRAMIDE HYDROCHLORIDE 5 MG/ML
10 INJECTION INTRAMUSCULAR; INTRAVENOUS
Status: COMPLETED | OUTPATIENT
Start: 2022-03-22 | End: 2022-03-22

## 2022-03-22 RX ORDER — ATORVASTATIN CALCIUM 20 MG/1
20 TABLET, FILM COATED ORAL DAILY
Status: DISCONTINUED | OUTPATIENT
Start: 2022-03-22 | End: 2022-03-23 | Stop reason: HOSPADM

## 2022-03-22 RX ORDER — OSELTAMIVIR PHOSPHATE 75 MG/1
75 CAPSULE ORAL
Status: COMPLETED | OUTPATIENT
Start: 2022-03-22 | End: 2022-03-22

## 2022-03-22 RX ORDER — KETOROLAC TROMETHAMINE 30 MG/ML
15 INJECTION, SOLUTION INTRAMUSCULAR; INTRAVENOUS
Status: COMPLETED | OUTPATIENT
Start: 2022-03-22 | End: 2022-03-22

## 2022-03-22 RX ORDER — SODIUM CHLORIDE 9 MG/ML
1000 INJECTION, SOLUTION INTRAVENOUS
Status: COMPLETED | OUTPATIENT
Start: 2022-03-22 | End: 2022-03-22

## 2022-03-22 RX ORDER — ONDANSETRON 8 MG/1
8 TABLET, ORALLY DISINTEGRATING ORAL EVERY 8 HOURS PRN
Status: DISCONTINUED | OUTPATIENT
Start: 2022-03-22 | End: 2022-03-23 | Stop reason: HOSPADM

## 2022-03-22 RX ORDER — AMOXICILLIN 250 MG
1 CAPSULE ORAL 2 TIMES DAILY
Status: DISCONTINUED | OUTPATIENT
Start: 2022-03-22 | End: 2022-03-23 | Stop reason: HOSPADM

## 2022-03-22 RX ORDER — DIPHENHYDRAMINE HYDROCHLORIDE 50 MG/ML
25 INJECTION INTRAMUSCULAR; INTRAVENOUS
Status: COMPLETED | OUTPATIENT
Start: 2022-03-22 | End: 2022-03-22

## 2022-03-22 RX ORDER — TALC
9 POWDER (GRAM) TOPICAL NIGHTLY PRN
Status: DISCONTINUED | OUTPATIENT
Start: 2022-03-22 | End: 2022-03-23 | Stop reason: HOSPADM

## 2022-03-22 RX ORDER — LIDOCAINE 50 MG/G
1 PATCH TOPICAL DAILY PRN
Status: DISCONTINUED | OUTPATIENT
Start: 2022-03-22 | End: 2022-03-23 | Stop reason: HOSPADM

## 2022-03-22 RX ORDER — SODIUM CHLORIDE 0.9 % (FLUSH) 0.9 %
5 SYRINGE (ML) INJECTION
Status: DISCONTINUED | OUTPATIENT
Start: 2022-03-22 | End: 2022-03-23 | Stop reason: HOSPADM

## 2022-03-22 RX ORDER — CEFEPIME HYDROCHLORIDE 1 G/50ML
2 INJECTION, SOLUTION INTRAVENOUS
Status: DISCONTINUED | OUTPATIENT
Start: 2022-03-22 | End: 2022-03-23

## 2022-03-22 RX ORDER — ACETAMINOPHEN 500 MG
1000 TABLET ORAL
Status: COMPLETED | OUTPATIENT
Start: 2022-03-22 | End: 2022-03-22

## 2022-03-22 RX ORDER — NALOXONE HCL 0.4 MG/ML
0.02 VIAL (ML) INJECTION
Status: DISCONTINUED | OUTPATIENT
Start: 2022-03-22 | End: 2022-03-23 | Stop reason: HOSPADM

## 2022-03-22 RX ORDER — ACETAMINOPHEN 325 MG/1
650 TABLET ORAL EVERY 6 HOURS PRN
Status: DISCONTINUED | OUTPATIENT
Start: 2022-03-22 | End: 2022-03-23 | Stop reason: HOSPADM

## 2022-03-22 RX ORDER — ENOXAPARIN SODIUM 100 MG/ML
40 INJECTION SUBCUTANEOUS EVERY 24 HOURS
Status: DISCONTINUED | OUTPATIENT
Start: 2022-03-22 | End: 2022-03-23 | Stop reason: HOSPADM

## 2022-03-22 RX ORDER — LIDOCAINE 50 MG/G
1 PATCH TOPICAL DAILY PRN
Status: DISCONTINUED | OUTPATIENT
Start: 2022-03-22 | End: 2022-03-22

## 2022-03-22 RX ORDER — OSELTAMIVIR PHOSPHATE 75 MG/1
75 CAPSULE ORAL 2 TIMES DAILY
Status: DISCONTINUED | OUTPATIENT
Start: 2022-03-22 | End: 2022-03-23 | Stop reason: HOSPADM

## 2022-03-22 RX ORDER — DICLOFENAC SODIUM 10 MG/G
2 GEL TOPICAL 3 TIMES DAILY PRN
Status: DISCONTINUED | OUTPATIENT
Start: 2022-03-22 | End: 2022-03-23 | Stop reason: HOSPADM

## 2022-03-22 RX ORDER — BENZONATATE 100 MG/1
100 CAPSULE ORAL 3 TIMES DAILY PRN
Status: DISCONTINUED | OUTPATIENT
Start: 2022-03-22 | End: 2022-03-23 | Stop reason: HOSPADM

## 2022-03-22 RX ORDER — AZITHROMYCIN 250 MG/1
500 TABLET, FILM COATED ORAL
Status: COMPLETED | OUTPATIENT
Start: 2022-03-22 | End: 2022-03-22

## 2022-03-22 RX ADMIN — ENOXAPARIN SODIUM 40 MG: 100 INJECTION SUBCUTANEOUS at 04:03

## 2022-03-22 RX ADMIN — AZITHROMYCIN MONOHYDRATE 500 MG: 250 TABLET ORAL at 05:03

## 2022-03-22 RX ADMIN — CEFEPIME HYDROCHLORIDE 2 G: 2 INJECTION, SOLUTION INTRAVENOUS at 04:03

## 2022-03-22 RX ADMIN — SODIUM CHLORIDE 1000 ML: 0.9 INJECTION, SOLUTION INTRAVENOUS at 02:03

## 2022-03-22 RX ADMIN — SODIUM CHLORIDE 1000 ML: 0.9 INJECTION, SOLUTION INTRAVENOUS at 04:03

## 2022-03-22 RX ADMIN — VANCOMYCIN HYDROCHLORIDE 2500 MG: 10 INJECTION, POWDER, LYOPHILIZED, FOR SOLUTION INTRAVENOUS at 09:03

## 2022-03-22 RX ADMIN — ATORVASTATIN CALCIUM 20 MG: 20 TABLET, FILM COATED ORAL at 09:03

## 2022-03-22 RX ADMIN — CEFEPIME HYDROCHLORIDE 2 G: 2 INJECTION, SOLUTION INTRAVENOUS at 06:03

## 2022-03-22 RX ADMIN — ACETAMINOPHEN 650 MG: 325 TABLET ORAL at 09:03

## 2022-03-22 RX ADMIN — DOCUSATE SODIUM AND SENNOSIDES 1 TABLET: 8.6; 5 TABLET, FILM COATED ORAL at 09:03

## 2022-03-22 RX ADMIN — OSELTAMIVIR PHOSPHATE 75 MG: 75 CAPSULE ORAL at 09:03

## 2022-03-22 RX ADMIN — ACETAMINOPHEN 1000 MG: 500 TABLET ORAL at 02:03

## 2022-03-22 RX ADMIN — OSELTAMIVIR PHOSPHATE 75 MG: 75 CAPSULE ORAL at 04:03

## 2022-03-22 RX ADMIN — KETOROLAC TROMETHAMINE 15 MG: 30 INJECTION, SOLUTION INTRAMUSCULAR at 04:03

## 2022-03-22 RX ADMIN — DIPHENHYDRAMINE HYDROCHLORIDE 25 MG: 50 INJECTION, SOLUTION INTRAMUSCULAR; INTRAVENOUS at 02:03

## 2022-03-22 RX ADMIN — VANCOMYCIN HYDROCHLORIDE 1750 MG: 10 INJECTION, POWDER, LYOPHILIZED, FOR SOLUTION INTRAVENOUS at 09:03

## 2022-03-22 RX ADMIN — METOCLOPRAMIDE 10 MG: 5 INJECTION, SOLUTION INTRAMUSCULAR; INTRAVENOUS at 02:03

## 2022-03-22 RX ADMIN — SODIUM CHLORIDE, SODIUM LACTATE, POTASSIUM CHLORIDE, AND CALCIUM CHLORIDE 1000 ML: .6; .31; .03; .02 INJECTION, SOLUTION INTRAVENOUS at 08:03

## 2022-03-22 NOTE — PLAN OF CARE
Patient AAOx4, VSS, patient denies any pain, states feeling weak. Patient denies any SOB, received LR bolus and IV antibiotics. Patient free from falls. Call bell in reach. Safety maintained. Will continue to monitor.   Problem: Adult Inpatient Plan of Care  Goal: Plan of Care Review  Outcome: Ongoing, Progressing  Goal: Patient-Specific Goal (Individualized)  Outcome: Ongoing, Progressing  Goal: Absence of Hospital-Acquired Illness or Injury  Outcome: Ongoing, Progressing  Goal: Optimal Comfort and Wellbeing  Outcome: Ongoing, Progressing  Goal: Readiness for Transition of Care  Outcome: Ongoing, Progressing     Problem: Bariatric Environmental Safety  Goal: Safety Maintained with Care  Outcome: Ongoing, Progressing     Problem: Fluid Imbalance (Pneumonia)  Goal: Fluid Balance  Outcome: Ongoing, Progressing     Problem: Infection (Pneumonia)  Goal: Resolution of Infection Signs and Symptoms  Outcome: Ongoing, Progressing     Problem: Respiratory Compromise (Pneumonia)  Goal: Effective Oxygenation and Ventilation  Outcome: Ongoing, Progressing     Problem: Impaired Wound Healing  Goal: Optimal Wound Healing  Outcome: Ongoing, Progressing     Problem: Hypertension Comorbidity  Goal: Blood Pressure in Desired Range  Outcome: Ongoing, Progressing     Problem: Infection  Goal: Absence of Infection Signs and Symptoms  Outcome: Ongoing, Progressing

## 2022-03-22 NOTE — SUBJECTIVE & OBJECTIVE
Past Medical History:   Diagnosis Date    Hypertension        No past surgical history on file.    Review of patient's allergies indicates:  No Known Allergies    No current facility-administered medications on file prior to encounter.     Current Outpatient Medications on File Prior to Encounter   Medication Sig    amLODIPine (NORVASC) 10 MG tablet Take 1 tablet (10 mg total) by mouth once daily.    hydroCHLOROthiazide (HYDRODIURIL) 25 MG tablet Take 1 tablet (25 mg total) by mouth once daily.    lisinopriL (PRINIVIL,ZESTRIL) 20 MG tablet Take 1 tablet (20 mg total) by mouth once daily.    atorvastatin (LIPITOR) 20 MG tablet Take 1 tablet (20 mg total) by mouth once daily.     Family History       Problem Relation (Age of Onset)    Hypertension Maternal Grandmother, Maternal Grandfather          Tobacco Use    Smoking status: Never Smoker    Smokeless tobacco: Never Used   Substance and Sexual Activity    Alcohol use: Yes     Comment: occ    Drug use: No    Sexual activity: Yes     Partners: Male     Birth control/protection: None     Review of Systems   Constitutional:  Positive for activity change, appetite change, chills, fatigue and fever.   HENT:  Negative for congestion, nosebleeds, sinus pain, sore throat and trouble swallowing.    Eyes:  Negative for photophobia and visual disturbance.   Respiratory:  Negative for cough, chest tightness and shortness of breath.    Cardiovascular:  Positive for chest pain. Negative for palpitations and leg swelling.   Gastrointestinal:  Negative for abdominal pain, blood in stool, constipation, diarrhea, nausea and vomiting.   Genitourinary:  Negative for dysuria and hematuria.   Musculoskeletal:  Negative for back pain, joint swelling and myalgias.   Skin:  Negative for rash and wound.   Neurological:  Negative for dizziness, weakness, numbness and headaches.   Objective:     Vital Signs (Most Recent):  Temp: (!) 100.5 °F (38.1 °C) (03/22/22 0553)  Pulse: (!) 115 (03/22/22  0553)  Resp: 18 (03/22/22 0553)  BP: (!) 112/51 (03/22/22 0553)  SpO2: 99 % (03/22/22 0515)   Vital Signs (24h Range):  Temp:  [98.4 °F (36.9 °C)-101 °F (38.3 °C)] 100.5 °F (38.1 °C)  Pulse:  [114-146] 115  Resp:  [13-44] 18  SpO2:  [95 %-99 %] 99 %  BP: (100-145)/() 112/51     Weight: (!) 158.1 kg (348 lb 8.8 oz)  Body mass index is 54.59 kg/m².    Physical Exam  Vitals and nursing note reviewed.   Constitutional:       General: She is not in acute distress.     Appearance: Normal appearance. She is not ill-appearing or toxic-appearing.   HENT:      Head: Normocephalic and atraumatic.      Right Ear: External ear normal.      Left Ear: External ear normal.      Nose: Nose normal.      Mouth/Throat:      Mouth: Mucous membranes are dry.      Pharynx: Oropharynx is clear. No oropharyngeal exudate.   Eyes:      Extraocular Movements: Extraocular movements intact.      Conjunctiva/sclera: Conjunctivae normal.      Pupils: Pupils are equal, round, and reactive to light.   Cardiovascular:      Rate and Rhythm: Regular rhythm. Tachycardia present.      Heart sounds: No murmur heard.    No friction rub.   Pulmonary:      Effort: Pulmonary effort is normal. No respiratory distress.      Breath sounds: No wheezing, rhonchi or rales.      Comments: Decreased breath sounds in L lobes BL  Chest:      Chest wall: No tenderness.   Abdominal:      General: Bowel sounds are normal.      Palpations: Abdomen is soft. There is no mass.      Tenderness: There is no abdominal tenderness. There is no guarding.   Musculoskeletal:         General: No swelling or tenderness.      Cervical back: No tenderness.      Right lower leg: No edema.      Left lower leg: No edema.   Skin:     General: Skin is warm and dry.      Capillary Refill: Capillary refill takes 2 to 3 seconds.      Findings: No lesion or rash.   Neurological:      General: No focal deficit present.      Mental Status: She is alert and oriented to person, place, and time.       Cranial Nerves: No cranial nerve deficit.     Lab 03/22/22  0215   WBC 13.89*   HGB 11.4*   HCT 35.7*   MCV 95   RBC 3.75*   MCH 30.4   MCHC 31.9*   RDW 13.6      MPV 10.7   GRAN 89.5*  12.4*   LYMPH 6.6*  0.9*   MONO 3.0*  0.4   EOSINOPHIL 0.0   BASOPHIL 0.1     Recent Labs   Lab 03/22/22  0215      K 3.9      CO2 20*   ANIONGAP 13   BUN 15   CREATININE 1.2   *   CALCIUM 9.5   PROT 7.2   ALBUMIN 3.9   ALKPHOS 68   BILITOT 0.9   ALT 26   AST 14   ESTGFRAFRICA >60   EGFRNONAA 55*     Recent Labs   Lab 03/22/22  0405   COLORU Colorless*   APPEARANCEUA Clear   PHUR 5.0   SPECGRAV 1.010   PROTEINUA Negative   GLUCUA Negative   KETONESU Negative   BILIRUBINUA Negative   OCCULTUA Negative   UROBILINOGEN Negative   NITRITE Negative   LEUKOCYTESUR Negative     Recent Labs   Lab 03/22/22  0221 03/22/22  0339   TROPONINI 0.006  --    CPK  --  111     No results for input(s): PT, INR, APTT in the last 168 hours.  No results for input(s): TSH, B3EBCYY, Y8QROOO, THYROIDAB, FREET4 in the last 168 hours.  X-Ray Chest PA And Lateral    Result Date: 3/22/2022  EXAMINATION: XR CHEST PA AND LATERAL CLINICAL HISTORY: Weakness TECHNIQUE: PA and lateral views of the chest were performed. COMPARISON: 07/09/2017 FINDINGS: There is a new round area of opacity in the left mid lung.  The remainder of the lung parenchyma appears clear with some mild prominence of the pulmonary vasculature in both mo.  The lower lungs are clear there is no effusion.  The heart mediastinal contours are stable.     New round opacity in the left mid lung.  Correlate for left round pneumonia. Electronically signed by: Roni Frederick Date:    03/22/2022 Time:    04:41    Microbiology Results (last 7 days)       Procedure Component Value Units Date/Time    Blood culture #1 **CANNOT BE ORDERED STAT** [[id number]] Collected: 03/22/22 0526    Order Status: Sent Specimen: Blood from Peripheral, Left Hand Updated: 03/22/22 0528    Blood  culture #2 **CANNOT BE ORDERED STAT** [[id number]] Collected: 03/22/22 0527    Order Status: Sent Specimen: Blood from Peripheral, Right Hand Updated: 03/22/22 0528    Culture, Respiratory with Gram Stain [[id number]]     Order Status: No result Specimen: Sputum, Expectorated     Influenza A & B by Molecular [[id number]]  (Abnormal) Collected: 03/22/22 0159    Order Status: Completed Specimen: Nasopharyngeal Swab Updated: 03/22/22 0254     Influenza A, Molecular Positive     Influenza B, Molecular Negative     Flu A & B Source Nasal swab

## 2022-03-22 NOTE — HPI
43 yo F w/ PMHx of HTN and HLD who presents with Flu A and pneumonia. Pt reports she ahs been feeling cold sx's for 1 wk prior including subjective fevers, chills, body aches, fatigue, and decreased appetite as well as chest pain w/ inspiration. She has been using robitussin, tylenol, and Theraflu at home with some improvement and initially thought she was feeling much better; however, after picking up her kids from school yesterday afternoon she began to feel much worse. She reports worsening fever and chills during this time but did not take her temp at home. She states she had pneumonia in 09 but felt similarly so she decided to come to the ER. She denies any recent sick contacts or travel, but was recently taking a leftover abx from her daughter for a UTI, but is unsure of the name. She denies any cough/N/V/D/C.    In the ED she was initially afebrile but did spike a temp to 1001 which responded to tylenol. She was also tachycardic to 118, tachpnic to 23 and initially /59 but became hypotensive to 100/50. She is sating 99% on RA. CXR showed a round opacity in the left mid lung. EKG shows Sinus Tachycardia and LVH. CBC notable for elevated WBC to 13.89. CMP and UA unremarkable. Trop and BNP wnl. COVID neg but Flu A positive. Lactic acid was elevated to 3.3. She was given 2x 1L NS bolus as well as started on tamiflu and given a dose of azithromycin. She was admitted to the  service for tx of her pneumonia.

## 2022-03-22 NOTE — ASSESSMENT & PLAN NOTE
Pt 1 wk URI sx's including subjective fevers, chills, fatigue, body aches, and decreased appetite  Febrile, Tachycardic, Tachypnic, and Hypotensive in ED   CXR w/ signs of new L sided pneumonia  WBC and lactic acid elevated to 13 and 3.3 on admit  Flu A positive but COVID neg  S/p 2L NS bolus in ED   S/p 1 dose azithro and tamiflu in ED  CURB-65 of 1  Severe Sepsis w/o signs of     Plan:  Vanc, Cefepime, and Azithromycin   Cnt Tamiflu for 5 days  Blood and Sputum cx pending  Procal pending  Trend lactic acid q6hr

## 2022-03-22 NOTE — PLAN OF CARE
VN note: Patient chart, labs, and vitals reviewed. VN to continue to be available as needed.     Problem: Adult Inpatient Plan of Care  Goal: Plan of Care Review  Outcome: Ongoing, Progressing  Goal: Patient-Specific Goal (Individualized)  Outcome: Ongoing, Progressing  Goal: Absence of Hospital-Acquired Illness or Injury  Outcome: Ongoing, Progressing  Goal: Optimal Comfort and Wellbeing  Outcome: Ongoing, Progressing  Goal: Readiness for Transition of Care  Outcome: Ongoing, Progressing     Problem: Bariatric Environmental Safety  Goal: Safety Maintained with Care  Outcome: Ongoing, Progressing     Problem: Fluid Imbalance (Pneumonia)  Goal: Fluid Balance  Outcome: Ongoing, Progressing     Problem: Infection (Pneumonia)  Goal: Resolution of Infection Signs and Symptoms  Outcome: Ongoing, Progressing     Problem: Respiratory Compromise (Pneumonia)  Goal: Effective Oxygenation and Ventilation  Outcome: Ongoing, Progressing     Problem: Impaired Wound Healing  Goal: Optimal Wound Healing  Outcome: Ongoing, Progressing     Problem: Hypertension Comorbidity  Goal: Blood Pressure in Desired Range  Outcome: Ongoing, Progressing     Problem: Infection  Goal: Absence of Infection Signs and Symptoms  Outcome: Ongoing, Progressing

## 2022-03-22 NOTE — ASSESSMENT & PLAN NOTE
Pt initially HTN on admit but became Hypotensive while still in ED  Home regimen includes amlodipine, lisinopril, and HCTZ  S/p 2x 1L NS bolus pt normotensive    Plan:  Hold home medications for hypotension on admit but will restart when necessary

## 2022-03-22 NOTE — PHARMACY MED REC
"Admission Medication History     The home medication history was taken by Juana Wallace PharmD.    Medication history obtained from patient    You may go to "Admission" then "Reconcile Home Medications" tabs to review and/or act upon these items.      The home medication list has been updated by the Pharmacy department.    Please read ALL comments highlighted in yellow.    Please address this information as you see fit.     Feel free to contact us if you have any questions or require assistance.      The medications listed below were removed from the home medication list.  Please reorder if appropriate:    o Patient reports he/she IS TAKING the following which was not ordered upon admit  o Amlodipine 10mg  o HCTZ 25mg  o lisinipril 20mg        Juana Wallace PharmD.                  .          "

## 2022-03-22 NOTE — PROVIDER PROGRESS NOTES - EMERGENCY DEPT.
Encounter Date: 3/22/2022    ED Physician Progress Notes        Physician Note:   Received sign-out from Dr. Norton.  Patient resting in bed no acute distress plan was follow-up blood work imaging reassessment.  Patient is flu positive.  Found to have a pneumonia on x-ray.  Patient found to have lactic acidosis and leukocytosis.  Concern for sepsis from flu pneumonia.  Antibiotics initiated patient given fluid bolus.  Will initiate Tamiflu.  Will plan admission to [facility] who accepted patient to their service.  Patient agrees with plan.

## 2022-03-22 NOTE — PLAN OF CARE
TN met with pt via Video Connect. Pt AAOx4.    Pt lives with her three children. Pt independent with ADLs and able to drive self. Family member to provide transportation on discharge. No d/c needs noted.    Future Appointments   Date Time Provider Department Center   3/23/2022  2:45 PM [facility] JONATHAN [facility]        03/22/22 0926   Discharge Planning   Assessment Type Discharge Planning Assessment   Resource/Environmental Concerns none   Support Systems Children;Family members   Assistance Needed none   Equipment Currently Used at Home none   Current Living Arrangements home/apartment/condo   Patient/Family Anticipates Transition to home with family   DME Needed Upon Discharge  none   Discharge Plan A Home with family   Discharge Plan B Home with family

## 2022-03-22 NOTE — H&P
[facility] Medicine  History & Physical    Patient Name: Dirk Issa  MRN: [id number]  Patient Class: OP- Observation  Admission Date: 3/22/2022  Attending Physician: Alli Duong MD   Primary Care Provider: Denis Hidalgo MD (Inactive)         Patient information was obtained from patient and ER records.     Subjective:     Principal Problem:Pneumonia    Chief Complaint:   Chief Complaint   Patient presents with    Fever    Headache    Generalized Body Aches     Patient states she has general body pain. Patient states her head and both sides hurt. Patient states she's been fighting a cold for a few days but today the pain became a lot worst. Patient states she has a fever.        HPI: 41 yo F w/ PMHx of HTN and HLD who presents with Flu A and pneumonia. Pt reports she ahs been feeling cold sx's for 1 wk prior including subjective fevers, chills, body aches, fatigue, and decreased appetite as well as chest pain w/ inspiration. She has been using robitussin, tylenol, and Theraflu at home with some improvement and initially thought she was feeling much better; however, after picking up her kids from school yesterday afternoon she began to feel much worse. She reports worsening fever and chills during this time but did not take her temp at home. She states she had pneumonia in 09 but felt similarly so she decided to come to the ER. She denies any recent sick contacts or travel, but was recently taking a leftover abx from her daughter for a UTI, but is unsure of the name. She denies any cough/N/V/D/C.    In the ED she was initially afebrile but did spike a temp to 1001 which responded to tylenol. She was also tachycardic to 118, tachpnic to 23 and initially /59 but became hypotensive to 100/50. She is sating 99% on RA. CXR showed a round opacity in the left mid lung. EKG shows Sinus Tachycardia and LVH. CBC notable for elevated WBC to 13.89. CMP and UA unremarkable. Trop and BNP wnl.  COVID neg but Flu A positive. Lactic acid was elevated to 3.3. She was given 2x 1L NS bolus as well as started on tamiflu and given a dose of azithromycin. She was admitted to the  service for tx of her pneumonia.         Past Medical History:   Diagnosis Date    Hypertension        No past surgical history on file.    Review of patient's allergies indicates:  No Known Allergies    No current facility-administered medications on file prior to encounter.     Current Outpatient Medications on File Prior to Encounter   Medication Sig    amLODIPine (NORVASC) 10 MG tablet Take 1 tablet (10 mg total) by mouth once daily.    hydroCHLOROthiazide (HYDRODIURIL) 25 MG tablet Take 1 tablet (25 mg total) by mouth once daily.    lisinopriL (PRINIVIL,ZESTRIL) 20 MG tablet Take 1 tablet (20 mg total) by mouth once daily.    atorvastatin (LIPITOR) 20 MG tablet Take 1 tablet (20 mg total) by mouth once daily.     Family History       Problem Relation (Age of Onset)    Hypertension Maternal Grandmother, Maternal Grandfather          Tobacco Use    Smoking status: Never Smoker    Smokeless tobacco: Never Used   Substance and Sexual Activity    Alcohol use: Yes     Comment: occ    Drug use: No    Sexual activity: Yes     Partners: Male     Birth control/protection: None     Review of Systems   Constitutional:  Positive for activity change, appetite change, chills, fatigue and fever.   HENT:  Negative for congestion, nosebleeds, sinus pain, sore throat and trouble swallowing.    Eyes:  Negative for photophobia and visual disturbance.   Respiratory:  Negative for cough, chest tightness and shortness of breath.    Cardiovascular:  Positive for chest pain. Negative for palpitations and leg swelling.   Gastrointestinal:  Negative for abdominal pain, blood in stool, constipation, diarrhea, nausea and vomiting.   Genitourinary:  Negative for dysuria and hematuria.   Musculoskeletal:  Negative for back pain, joint swelling and  myalgias.   Skin:  Negative for rash and wound.   Neurological:  Negative for dizziness, weakness, numbness and headaches.   Objective:     Vital Signs (Most Recent):  Temp: (!) 100.5 °F (38.1 °C) (03/22/22 0553)  Pulse: (!) 115 (03/22/22 0553)  Resp: 18 (03/22/22 0553)  BP: (!) 112/51 (03/22/22 0553)  SpO2: 99 % (03/22/22 0515)   Vital Signs (24h Range):  Temp:  [98.4 °F (36.9 °C)-101 °F (38.3 °C)] 100.5 °F (38.1 °C)  Pulse:  [114-146] 115  Resp:  [13-44] 18  SpO2:  [95 %-99 %] 99 %  BP: (100-145)/() 112/51     Weight: (!) 158.1 kg (348 lb 8.8 oz)  Body mass index is 54.59 kg/m².    Physical Exam  Vitals and nursing note reviewed.   Constitutional:       General: She is not in acute distress.     Appearance: Normal appearance. She is not ill-appearing or toxic-appearing.   HENT:      Head: Normocephalic and atraumatic.      Right Ear: External ear normal.      Left Ear: External ear normal.      Nose: Nose normal.      Mouth/Throat:      Mouth: Mucous membranes are dry.      Pharynx: Oropharynx is clear. No oropharyngeal exudate.   Eyes:      Extraocular Movements: Extraocular movements intact.      Conjunctiva/sclera: Conjunctivae normal.      Pupils: Pupils are equal, round, and reactive to light.   Cardiovascular:      Rate and Rhythm: Regular rhythm. Tachycardia present.      Heart sounds: No murmur heard.    No friction rub.   Pulmonary:      Effort: Pulmonary effort is normal. No respiratory distress.      Breath sounds: No wheezing, rhonchi or rales.      Comments: Decreased breath sounds in L lobes BL  Chest:      Chest wall: No tenderness.   Abdominal:      General: Bowel sounds are normal.      Palpations: Abdomen is soft. There is no mass.      Tenderness: There is no abdominal tenderness. There is no guarding.   Musculoskeletal:         General: No swelling or tenderness.      Cervical back: No tenderness.      Right lower leg: No edema.      Left lower leg: No edema.   Skin:     General: Skin is  warm and dry.      Capillary Refill: Capillary refill takes 2 to 3 seconds.      Findings: No lesion or rash.   Neurological:      General: No focal deficit present.      Mental Status: She is alert and oriented to person, place, and time.      Cranial Nerves: No cranial nerve deficit.     Lab 03/22/22  0215   WBC 13.89*   HGB 11.4*   HCT 35.7*   MCV 95   RBC 3.75*   MCH 30.4   MCHC 31.9*   RDW 13.6      MPV 10.7   GRAN 89.5*  12.4*   LYMPH 6.6*  0.9*   MONO 3.0*  0.4   EOSINOPHIL 0.0   BASOPHIL 0.1     Recent Labs   Lab 03/22/22  0215      K 3.9      CO2 20*   ANIONGAP 13   BUN 15   CREATININE 1.2   *   CALCIUM 9.5   PROT 7.2   ALBUMIN 3.9   ALKPHOS 68   BILITOT 0.9   ALT 26   AST 14   ESTGFRAFRICA >60   EGFRNONAA 55*     Recent Labs   Lab 03/22/22  0405   COLORU Colorless*   APPEARANCEUA Clear   PHUR 5.0   SPECGRAV 1.010   PROTEINUA Negative   GLUCUA Negative   KETONESU Negative   BILIRUBINUA Negative   OCCULTUA Negative   UROBILINOGEN Negative   NITRITE Negative   LEUKOCYTESUR Negative     Recent Labs   Lab 03/22/22  0221 03/22/22  0339   TROPONINI 0.006  --    CPK  --  111     No results for input(s): PT, INR, APTT in the last 168 hours.  No results for input(s): TSH, U6KVLGO, Q1LLHVC, THYROIDAB, FREET4 in the last 168 hours.  X-Ray Chest PA And Lateral    Result Date: 3/22/2022  EXAMINATION: XR CHEST PA AND LATERAL CLINICAL HISTORY: Weakness TECHNIQUE: PA and lateral views of the chest were performed. COMPARISON: 07/09/2017 FINDINGS: There is a new round area of opacity in the left mid lung.  The remainder of the lung parenchyma appears clear with some mild prominence of the pulmonary vasculature in both mo.  The lower lungs are clear there is no effusion.  The heart mediastinal contours are stable.     New round opacity in the left mid lung.  Correlate for left round pneumonia. Electronically signed by: Roni Frederick Date:    03/22/2022 Time:    04:41    Microbiology Results  (last 7 days)       Procedure Component Value Units Date/Time    Blood culture #1 **CANNOT BE ORDERED STAT** [[id number]] Collected: 03/22/22 0526    Order Status: Sent Specimen: Blood from Peripheral, Left Hand Updated: 03/22/22 0528    Blood culture #2 **CANNOT BE ORDERED STAT** [[id number]] Collected: 03/22/22 0527    Order Status: Sent Specimen: Blood from Peripheral, Right Hand Updated: 03/22/22 0528    Culture, Respiratory with Gram Stain [[id number]]     Order Status: No result Specimen: Sputum, Expectorated     Influenza A & B by Molecular [[id number]]  (Abnormal) Collected: 03/22/22 0159    Order Status: Completed Specimen: Nasopharyngeal Swab Updated: 03/22/22 0254     Influenza A, Molecular Positive     Influenza B, Molecular Negative     Flu A & B Source Nasal swab              Assessment/Plan:     * Pneumonia  Pt 1 wk URI sx's including subjective fevers, chills, fatigue, body aches, and decreased appetite  Febrile, Tachycardic, Tachypnic, and Hypotensive in ED   CXR w/ signs of new L sided pneumonia  WBC and lactic acid elevated to 13 and 3.3 on admit  Flu A positive but COVID neg  S/p 2L NS bolus in ED   S/p 1 dose azithro and tamiflu in ED  CURB-65 of 1  Severe Sepsis w/o signs of     Plan:  Vanc, Cefepime, and Azithromycin   Cnt Tamiflu for 5 days  Blood and Sputum cx pending  Procal pending  Trend lactic acid q6hr      HLD (hyperlipidemia)  Cnt home atorvastatin      Hypertension  Pt initially HTN on admit but became Hypotensive while still in ED  Home regimen includes amlodipine, lisinopril, and HCTZ  S/p 2x 1L NS bolus pt normotensive    Plan:  Hold home medications for hypotension on admit but will restart when necessary         VTE Risk Mitigation (From admission, onward)         Ordered     enoxaparin injection 40 mg  Daily         03/22/22 0516     IP VTE HIGH RISK PATIENT  Once         03/22/22 0516     Place sequential compression device  Until discontinued         03/22/22 0516                    Castillo Dobbins MD  Department of Hospital Medicine   [facility]

## 2022-03-22 NOTE — PROGRESS NOTES
03/22/22 0608   Admission   Initial VN Admission Questions Complete   Communication Issues? None   Shift   Virtual Nurse - Patient Verbalized Approval Of Camera Use;VN Rounding   Safety/Activity   Patient Rounds visualized patient;clutter free environment maintained   Safety Promotion/Fall Prevention instructed to call staff for mobility;Fall Risk reviewed with patient/family   VIRTUAL NURSE: Admission questions completed with patient at this time. Care plan and safety precautions reviewed. Pt verbalized no complaints, no needs expressed. Instructed to use call light for assistance, she verbalized understanding. Will continue to monitor.

## 2022-03-22 NOTE — ED PROVIDER NOTES
Encounter Date: 3/22/2022       History     Chief Complaint   Patient presents with    Fever    Headache    Generalized Body Aches     Patient states she has general body pain. Patient states her head and both sides hurt. Patient states she's been fighting a cold for a few days but today the pain became a lot worst. Patient states she has a fever.     42-year-old female presents complaining of fever, pain with inspiration, and left flank pain for the past day.  She states that she has been fighting an upper respiratory infection.  She states that she took Tylenol shortly before arriving here.  She describes the pain as mild to moderate, sharp, constant, with nothing exacerbating or relieving the pain.  She admits to urinary frequency.  She denies any nausea/vomiting/diarrhea.        Review of patient's allergies indicates:  No Known Allergies  Past Medical History:   Diagnosis Date    Hypertension      No past surgical history on file.  Family History   Problem Relation Age of Onset    Hypertension Maternal Grandmother     Hypertension Maternal Grandfather      Social History     Tobacco Use    Smoking status: Never Smoker    Smokeless tobacco: Never Used   Substance Use Topics    Alcohol use: Yes     Comment: occ    Drug use: No     Review of Systems   Genitourinary: Positive for flank pain.   All other systems reviewed and are negative.      Physical Exam     Initial Vitals [03/22/22 0113]   BP Pulse Resp Temp SpO2   (!) 138/59 (!) 118 20 99.4 °F (37.4 °C) 99 %      MAP       --         Physical Exam    Nursing note and vitals reviewed.  Constitutional: She appears well-developed and well-nourished.   HENT:   Head: Normocephalic and atraumatic.   Eyes: EOM are normal. Pupils are equal, round, and reactive to light.   Cardiovascular:   Tachycardic rate, regular rhythm   Pulmonary/Chest: Breath sounds normal.   Abdominal: Abdomen is soft. There is no abdominal tenderness.   Genitourinary:    Genitourinary  Comments: Left-sided flank pain     Musculoskeletal:         General: Normal range of motion.     Neurological: She is alert and oriented to person, place, and time.   Skin: Skin is warm and dry. Capillary refill takes less than 2 seconds.   Psychiatric:   Anxious mood         ED Course   Critical Care    Date/Time: 3/22/2022 6:19 AM  Performed by: Ziggy Norton DO  Authorized by: Ziggy Norton DO   Direct patient critical care time: 20 minutes  Documentation critical care time: 5 minutes  Consulting other physicians critical care time: 5 minutes  Consult with family critical care time: 5 minutes  Total critical care time (exclusive of procedural time) : 35 minutes  Critical care time was exclusive of separately billable procedures and treating other patients.  Critical care was necessary to treat or prevent imminent or life-threatening deterioration of the following conditions: circulatory failure and sepsis.  Critical care was time spent personally by me on the following activities: development of treatment plan with patient or surrogate, discussions with consultants, interpretation of cardiac output measurements, evaluation of patient's response to treatment, examination of patient, obtaining history from patient or surrogate, ordering and review of laboratory studies and re-evaluation of patient's condition.        Labs Reviewed   INFLUENZA A & B BY MOLECULAR - Abnormal; Notable for the following components:       Result Value    Influenza A, Molecular Positive (*)     All other components within normal limits   CBC W/ AUTO DIFFERENTIAL - Abnormal; Notable for the following components:    WBC 13.89 (*)     RBC 3.75 (*)     Hemoglobin 11.4 (*)     Hematocrit 35.7 (*)     MCHC 31.9 (*)     Immature Granulocytes 0.8 (*)     Gran # (ANC) 12.4 (*)     Immature Grans (Abs) 0.11 (*)     Lymph # 0.9 (*)     Gran % 89.5 (*)     Lymph % 6.6 (*)     Mono % 3.0 (*)     All other components within normal  limits   COMPREHENSIVE METABOLIC PANEL - Abnormal; Notable for the following components:    CO2 20 (*)     Glucose 119 (*)     eGFR if non  55 (*)     All other components within normal limits   URINALYSIS, REFLEX TO URINE CULTURE - Abnormal; Notable for the following components:    Color, UA Colorless (*)     All other components within normal limits    Narrative:     Specimen Source->Urine   LACTIC ACID, PLASMA - Abnormal; Notable for the following components:    Lactate (Lactic Acid) 3.3 (*)     All other components within normal limits   CULTURE, RESPIRATORY   PREGNANCY TEST, URINE RAPID    Narrative:     Specimen Source->Urine   SARS-COV-2 RNA AMPLIFICATION, QUAL   TROPONIN I   B-TYPE NATRIURETIC PEPTIDE   CK   CK   POCT URINE PREGNANCY     EKG Readings: (Independently Interpreted)   Sinus rhythm rate of 130 left axis deviation normal intervals, nonspecific ST T-wave abnormalities interpreted by me     ECG Results          EKG 12-lead (Final result)  Result time 03/22/22 11:19:08    Final result by Interface, [facility] (03/22/22 11:19:08)                 Narrative:    Test Reason : R53.1,    Vent. Rate : 130 BPM     Atrial Rate : 130 BPM     P-R Int : 126 ms          QRS Dur : 082 ms      QT Int : 304 ms       P-R-T Axes : 066 004 228 degrees     QTc Int : 447 ms    Sinus tachycardia  LVH with repolarization abnormality  Abnormal ECG  Confirmed by Iraj BLANCO, Spenser ROJAS ([id number]) on 3/22/2022 11:18:59 AM    Referred By: SOYERR   SELF           Confirmed By:Spenser Galo MD                            Imaging Results          X-Ray Chest PA And Lateral (Final result)  Result time 03/22/22 04:41:22    Final result by Roni Frederick MD (03/22/22 04:41:22)                 Impression:      New round opacity in the left mid lung.  Correlate for left round pneumonia.      Electronically signed by: Roni Frederick  Date:    03/22/2022  Time:    04:41             Narrative:    EXAMINATION:  XR  CHEST PA AND LATERAL    CLINICAL HISTORY:  Weakness    TECHNIQUE:  PA and lateral views of the chest were performed.    COMPARISON:  07/09/2017    FINDINGS:  There is a new round area of opacity in the left mid lung.  The remainder of the lung parenchyma appears clear with some mild prominence of the pulmonary vasculature in both mo.  The lower lungs are clear there is no effusion.  The heart mediastinal contours are stable.                                 Medications   atorvastatin tablet 20 mg (20 mg Oral Given 3/22/22 0902)   sodium chloride 0.9% flush 5 mL (has no administration in time range)   melatonin tablet 9 mg (has no administration in time range)   senna-docusate 8.6-50 mg per tablet 1 tablet (1 tablet Oral Given 3/22/22 0902)   naloxone 0.4 mg/mL injection 0.02 mg (has no administration in time range)   enoxaparin injection 40 mg (40 mg Subcutaneous Given 3/22/22 1652)   acetaminophen tablet 650 mg (has no administration in time range)   ondansetron disintegrating tablet 8 mg (has no administration in time range)   vancomycin - pharmacy to dose (has no administration in time range)   cefepime in dextrose 5 % IVPB 2 g (0 g Intravenous Stopped 3/22/22 1726)   azithromycin 500 mg in dextrose 5 % 250 mL IVPB (ready to mix system) (has no administration in time range)   vancomycin (VANCOCIN) 1,750 mg in dextrose 5 % 500 mL IVPB (1,750 mg Intravenous Trough Due As Scheduled Before Dose 3/23/22 1900)   oseltamivir capsule 75 mg (has no administration in time range)   LIDOcaine 5 % patch 1 patch (has no administration in time range)   diclofenac sodium 1 % gel 2 g (has no administration in time range)   benzonatate capsule 100 mg (has no administration in time range)   sodium chloride 0.9% bolus 1,000 mL (0 mLs Intravenous Stopped 3/22/22 0323)   metoclopramide HCl injection 10 mg (10 mg Intravenous Given 3/22/22 0226)   diphenhydrAMINE injection 25 mg (25 mg Intravenous Given 3/22/22 0228)   0.9%  NaCl  infusion (1,000 mLs Intravenous New Bag 3/22/22 0245)   acetaminophen tablet 1,000 mg (1,000 mg Oral Given 3/22/22 0251)   sodium chloride 0.9% bolus 1,000 mL (0 mLs Intravenous Stopped 3/22/22 0551)   ketorolac injection 15 mg (15 mg Intravenous Given 3/22/22 7575)   oseltamivir capsule 75 mg (75 mg Oral Given 3/22/22 0455)   azithromycin tablet 500 mg (500 mg Oral Given 3/22/22 0533)   vancomycin (VANCOCIN) 2,500 mg in dextrose 5 % 500 mL IVPB (0 mg Intravenous Stopped 3/22/22 1132)   lactated ringers bolus 1,000 mL (0 mLs Intravenous Stopped 3/22/22 0952)                          Clinical Impression:   Final diagnoses:  [R53.1] Weakness  [J10.1] Influenza A (Primary)  [J09.X1] Influenza A with pneumonia  [A41.9] Sepsis, due to unspecified organism, unspecified whether acute organ dysfunction present          ED Disposition Condition    Observation               Ziggy Norton,   03/22/22 3064       Ziggy Norton,   04/30/22 9432

## 2022-03-22 NOTE — PROGRESS NOTES
Pharmacokinetic Initial Assessment: IV Vancomycin    Assessment/Plan:    Initiate intravenous vancomycin with loading dose of 2500 mg once followed by a maintenance dose of vancomycin 1750mg IV every 12 hours  Desired empiric serum trough concentration is 15 to 20 mcg/mL  Draw vancomycin trough level 60 min prior to fourth dose on 3/23 at approximately 1900  Pharmacy will continue to follow and monitor vancomycin.      Please contact pharmacy at extension [phone] with any questions regarding this assessment.     Thank you for the consult,   Kenney Cage       Patient brief summary:  Dirk Issa is a 42 y.o. female initiated on antimicrobial therapy with IV Vancomycin for treatment of suspected lower respiratory infection    Drug Allergies:   Review of patient's allergies indicates:  No Known Allergies    Actual Body Weight:   158kg    Renal Function:   Estimated Creatinine Clearance: 95.6 mL/min (based on SCr of 1.2 mg/dL).,     Dialysis Method (if applicable):  N/A    CBC (last 72 hours):  Recent Labs   Lab Result Units 03/22/22  0215   WBC K/uL 13.89*   Hemoglobin g/dL 11.4*   Hematocrit % 35.7*   Platelets K/uL 227   Gran % % 89.5*   Lymph % % 6.6*   Mono % % 3.0*   Eosinophil % % 0.0   Basophil % % 0.1   Differential Method  Automated       Metabolic Panel (last 72 hours):  Recent Labs   Lab Result Units 03/22/22  0215 03/22/22  0405   Sodium mmol/L 136  --    Potassium mmol/L 3.9  --    Chloride mmol/L 103  --    CO2 mmol/L 20*  --    Glucose mg/dL 119*  --    Glucose, UA   --  Negative   BUN mg/dL 15  --    Creatinine mg/dL 1.2  --    Albumin g/dL 3.9  --    Total Bilirubin mg/dL 0.9  --    Alkaline Phosphatase U/L 68  --    AST U/L 14  --    ALT U/L 26  --        Drug levels (last 3 results):  No results for input(s): VANCOMYCINRA, VANCOMYCINPE, VANCOMYCINTR in the last 72 hours.    Microbiologic Results:  Microbiology Results (last 7 days)       Procedure Component Value Units Date/Time    Blood  culture #1 **CANNOT BE ORDERED STAT** [[id number]] Collected: 03/22/22 0526    Order Status: Sent Specimen: Blood from Peripheral, Left Hand Updated: 03/22/22 0528    Blood culture #2 **CANNOT BE ORDERED STAT** [[id number]] Collected: 03/22/22 0527    Order Status: Sent Specimen: Blood from Peripheral, Right Hand Updated: 03/22/22 0528    Culture, Respiratory with Gram Stain [[id number]]     Order Status: No result Specimen: Sputum, Expectorated     Influenza A & B by Molecular [[id number]]  (Abnormal) Collected: 03/22/22 0159    Order Status: Completed Specimen: Nasopharyngeal Swab Updated: 03/22/22 0254     Influenza A, Molecular Positive     Influenza B, Molecular Negative     Flu A & B Source Nasal swab

## 2022-03-23 VITALS
TEMPERATURE: 97 F | DIASTOLIC BLOOD PRESSURE: 88 MMHG | HEART RATE: 88 BPM | RESPIRATION RATE: 18 BRPM | OXYGEN SATURATION: 98 % | SYSTOLIC BLOOD PRESSURE: 131 MMHG | WEIGHT: 293 LBS | HEIGHT: 67 IN | BODY MASS INDEX: 45.99 KG/M2

## 2022-03-23 LAB
ALBUMIN SERPL BCP-MCNC: 3 G/DL (ref 3.5–5.2)
ALP SERPL-CCNC: 72 U/L (ref 55–135)
ALT SERPL W/O P-5'-P-CCNC: 22 U/L (ref 10–44)
ANION GAP SERPL CALC-SCNC: 11 MMOL/L (ref 8–16)
AST SERPL-CCNC: 15 U/L (ref 10–40)
BASOPHILS # BLD AUTO: 0.07 K/UL (ref 0–0.2)
BASOPHILS NFR BLD: 0.3 % (ref 0–1.9)
BILIRUB SERPL-MCNC: 0.5 MG/DL (ref 0.1–1)
BUN SERPL-MCNC: 15 MG/DL (ref 6–20)
CALCIUM SERPL-MCNC: 9.2 MG/DL (ref 8.7–10.5)
CHLORIDE SERPL-SCNC: 107 MMOL/L (ref 95–110)
CO2 SERPL-SCNC: 19 MMOL/L (ref 23–29)
CREAT SERPL-MCNC: 1.1 MG/DL (ref 0.5–1.4)
DIFFERENTIAL METHOD: ABNORMAL
EOSINOPHIL # BLD AUTO: 0.2 K/UL (ref 0–0.5)
EOSINOPHIL NFR BLD: 0.9 % (ref 0–8)
ERYTHROCYTE [DISTWIDTH] IN BLOOD BY AUTOMATED COUNT: 13.9 % (ref 11.5–14.5)
EST. GFR  (AFRICAN AMERICAN): >60 ML/MIN/1.73 M^2
EST. GFR  (NON AFRICAN AMERICAN): >60 ML/MIN/1.73 M^2
GLUCOSE SERPL-MCNC: 134 MG/DL (ref 70–110)
HCT VFR BLD AUTO: 32.9 % (ref 37–48.5)
HGB BLD-MCNC: 10.9 G/DL (ref 12–16)
IMM GRANULOCYTES # BLD AUTO: 0.81 K/UL (ref 0–0.04)
IMM GRANULOCYTES NFR BLD AUTO: 3.9 % (ref 0–0.5)
LYMPHOCYTES # BLD AUTO: 2.5 K/UL (ref 1–4.8)
LYMPHOCYTES NFR BLD: 12.1 % (ref 18–48)
MAGNESIUM SERPL-MCNC: 1.8 MG/DL (ref 1.6–2.6)
MCH RBC QN AUTO: 31.3 PG (ref 27–31)
MCHC RBC AUTO-ENTMCNC: 33.1 G/DL (ref 32–36)
MCV RBC AUTO: 95 FL (ref 82–98)
MONOCYTES # BLD AUTO: 0.8 K/UL (ref 0.3–1)
MONOCYTES NFR BLD: 3.6 % (ref 4–15)
NEUTROPHILS # BLD AUTO: 16.5 K/UL (ref 1.8–7.7)
NEUTROPHILS NFR BLD: 79.2 % (ref 38–73)
NRBC BLD-RTO: 0 /100 WBC
PHOSPHATE SERPL-MCNC: 1.9 MG/DL (ref 2.7–4.5)
PLATELET # BLD AUTO: 231 K/UL (ref 150–450)
PMV BLD AUTO: 10.7 FL (ref 9.2–12.9)
POTASSIUM SERPL-SCNC: 3.8 MMOL/L (ref 3.5–5.1)
PROT SERPL-MCNC: 7 G/DL (ref 6–8.4)
RBC # BLD AUTO: 3.48 M/UL (ref 4–5.4)
SODIUM SERPL-SCNC: 137 MMOL/L (ref 136–145)
WBC # BLD AUTO: 20.85 K/UL (ref 3.9–12.7)

## 2022-03-23 PROCEDURE — 80053 COMPREHEN METABOLIC PANEL: CPT | Performed by: STUDENT IN AN ORGANIZED HEALTH CARE EDUCATION/TRAINING PROGRAM

## 2022-03-23 PROCEDURE — 96366 THER/PROPH/DIAG IV INF ADDON: CPT

## 2022-03-23 PROCEDURE — G0378 HOSPITAL OBSERVATION PER HR: HCPCS

## 2022-03-23 PROCEDURE — 25000003 PHARM REV CODE 250: Performed by: STUDENT IN AN ORGANIZED HEALTH CARE EDUCATION/TRAINING PROGRAM

## 2022-03-23 PROCEDURE — 36415 COLL VENOUS BLD VENIPUNCTURE: CPT | Performed by: STUDENT IN AN ORGANIZED HEALTH CARE EDUCATION/TRAINING PROGRAM

## 2022-03-23 PROCEDURE — 83735 ASSAY OF MAGNESIUM: CPT | Performed by: STUDENT IN AN ORGANIZED HEALTH CARE EDUCATION/TRAINING PROGRAM

## 2022-03-23 PROCEDURE — 94761 N-INVAS EAR/PLS OXIMETRY MLT: CPT

## 2022-03-23 PROCEDURE — 85025 COMPLETE CBC W/AUTO DIFF WBC: CPT | Performed by: STUDENT IN AN ORGANIZED HEALTH CARE EDUCATION/TRAINING PROGRAM

## 2022-03-23 PROCEDURE — 96367 TX/PROPH/DG ADDL SEQ IV INF: CPT

## 2022-03-23 PROCEDURE — 84100 ASSAY OF PHOSPHORUS: CPT | Performed by: STUDENT IN AN ORGANIZED HEALTH CARE EDUCATION/TRAINING PROGRAM

## 2022-03-23 PROCEDURE — 63600175 PHARM REV CODE 636 W HCPCS: Performed by: STUDENT IN AN ORGANIZED HEALTH CARE EDUCATION/TRAINING PROGRAM

## 2022-03-23 RX ORDER — LEVOFLOXACIN 750 MG/1
750 TABLET ORAL DAILY
Status: DISCONTINUED | OUTPATIENT
Start: 2022-03-23 | End: 2022-03-23 | Stop reason: HOSPADM

## 2022-03-23 RX ORDER — BENZONATATE 100 MG/1
100 CAPSULE ORAL 3 TIMES DAILY PRN
Qty: 30 CAPSULE | Refills: 0 | Status: SHIPPED | OUTPATIENT
Start: 2022-03-23 | End: 2022-04-01

## 2022-03-23 RX ORDER — OSELTAMIVIR PHOSPHATE 75 MG/1
75 CAPSULE ORAL 2 TIMES DAILY
Qty: 10 CAPSULE | Refills: 0 | Status: SHIPPED | OUTPATIENT
Start: 2022-03-23 | End: 2022-03-28

## 2022-03-23 RX ORDER — LEVOFLOXACIN 750 MG/1
750 TABLET ORAL DAILY
Qty: 4 TABLET | Refills: 0 | Status: SHIPPED | OUTPATIENT
Start: 2022-03-23 | End: 2022-03-27

## 2022-03-23 RX ADMIN — AZITHROMYCIN 500 MG: 500 INJECTION, POWDER, LYOPHILIZED, FOR SOLUTION INTRAVENOUS at 06:03

## 2022-03-23 RX ADMIN — ATORVASTATIN CALCIUM 20 MG: 20 TABLET, FILM COATED ORAL at 08:03

## 2022-03-23 RX ADMIN — LEVOFLOXACIN 750 MG: 750 TABLET, FILM COATED ORAL at 08:03

## 2022-03-23 RX ADMIN — OSELTAMIVIR PHOSPHATE 75 MG: 75 CAPSULE ORAL at 08:03

## 2022-03-23 RX ADMIN — ACETAMINOPHEN 650 MG: 325 TABLET ORAL at 02:03

## 2022-03-23 RX ADMIN — CEFEPIME HYDROCHLORIDE 2 G: 2 INJECTION, SOLUTION INTRAVENOUS at 12:03

## 2022-03-23 NOTE — PLAN OF CARE
Discharge orders noted. Additional clinical references attached. Patient's discharge instructions given by bedside RN and reviewed by this VN. Education provided on home care instructions, new and previous medications, diagnosis, when to seek medical attention, and follow-up appointments. New medications to be delivered by pharmacy. Patient verbalized understanding and teach back method was used. Patient's aunt to provide ride/transportation home. All questions answered. Awaiting ride home and bedside delivery.

## 2022-03-23 NOTE — HOSPITAL COURSE
Pt admitted to [facility] for further evaluation and treatment. Pt was started on Tamiflu for influenza A and broad spectrum antibiotics for pneumonia. Pt remained afebrile, hemodynamically stable, maintaining O2 stats with improvement in symptoms. Broad spectrum antibiotic therapy narrowed to PO Levaquin for treatment of CAP. On day of discharge, pt stable and well-appearing. Plan for discharge with Tamiflu (x5 days total) and Levaquin 750 PO daily (x7 days total). Plan for follow-up with PCP. Pt agreeable to plan, expressed understanding, all questions answered, close return precautions given.

## 2022-03-23 NOTE — PROGRESS NOTES
Patient AAOx4, VSS, patient denies any pain. AVS printed and given to patient. Heart monitor removed from patient. Awaiting medications to be delivered at bedside, preparing for d/c

## 2022-03-23 NOTE — DISCHARGE SUMMARY
[facility] Medicine  Discharge Summary      Patient Name: Dirk Issa  MRN: [id number]  Patient Class: IP- Inpatient  Admission Date: 3/22/2022  Hospital Length of Stay: 1 days  Discharge Date and Time:  03/23/2022 12:01 PM  Attending Physician: No att. providers found   Discharging Provider: Anni Roman MD  Primary Care Provider: Denis Hidalgo MD (Inactive)    HPI:   43 yo F w/ PMHx of HTN and HLD who presents with Flu A and pneumonia. Pt reports she ahs been feeling cold sx's for 1 wk prior including subjective fevers, chills, body aches, fatigue, and decreased appetite as well as chest pain w/ inspiration. She has been using robitussin, tylenol, and Theraflu at home with some improvement and initially thought she was feeling much better; however, after picking up her kids from school yesterday afternoon she began to feel much worse. She reports worsening fever and chills during this time but did not take her temp at home. She states she had pneumonia in 09 but felt similarly so she decided to come to the ER. She denies any recent sick contacts or travel, but was recently taking a leftover abx from her daughter for a UTI, but is unsure of the name. She denies any cough/N/V/D/C.    In the ED she was initially afebrile but did spike a temp to 1001 which responded to tylenol. She was also tachycardic to 118, tachpnic to 23 and initially /59 but became hypotensive to 100/50. She is sating 99% on RA. CXR showed a round opacity in the left mid lung. EKG shows Sinus Tachycardia and LVH. CBC notable for elevated WBC to 13.89. CMP and UA unremarkable. Trop and BNP wnl. COVID neg but Flu A positive. Lactic acid was elevated to 3.3. She was given 2x 1L NS bolus as well as started on tamiflu and given a dose of azithromycin. She was admitted to the  service for tx of her pneumonia.       Hospital Course:   Pt admitted to [facility] for further evaluation and treatment. Pt was  started on Tamiflu for influenza A and broad spectrum antibiotics for pneumonia. Pt remained afebrile, hemodynamically stable, maintaining O2 stats with improvement in symptoms. Broad spectrum antibiotic therapy narrowed to PO Levaquin for treatment of CAP. On day of discharge, pt stable and well-appearing. Plan for discharge with Tamiflu (x5 days total) and Levaquin 750 PO daily (x7 days total). Plan for follow-up with PCP. Pt agreeable to plan, expressed understanding, all questions answered, close return precautions given.      Physical Exam  Vitals and nursing note reviewed.   Constitutional:       General: She is not in acute distress.     Appearance: Normal appearance. She is not ill-appearing or toxic-appearing.   HENT:      Head: Normocephalic and atraumatic.      Right Ear: External ear normal.      Left Ear: External ear normal.      Nose: Nose normal.      Mouth/Throat:      Mouth: Mucous membranes are dry.      Pharynx: Oropharynx is clear. No oropharyngeal exudate.   Eyes:      Extraocular Movements: Extraocular movements intact.      Conjunctiva/sclera: Conjunctivae normal.      Pupils: Pupils are equal, round, and reactive to light.   Cardiovascular:      Rate and Rhythm: Regular rate and rhythm.     Heart sounds: No murmur heard.    No friction rub.   Pulmonary:      Effort: Pulmonary effort is normal. No respiratory distress.      Breath sounds: No wheezing, rhonchi or rales.   Chest:      Chest wall: No tenderness.   Abdominal:      General: Bowel sounds are normal.      Palpations: Abdomen is soft. There is no mass.      Tenderness: There is no abdominal tenderness. There is no guarding.   Musculoskeletal:         General: No swelling or tenderness.      Cervical back: No tenderness.      Right lower leg: No edema.      Left lower leg: No edema.   Skin:     General: Skin is warm and dry.      Findings: No lesion or rash.   Neurological:      General: No focal deficit present.      Mental Status:  She is alert and oriented to person, place, and time.      Cranial Nerves: No cranial nerve deficit.    Goals of Care Treatment Preferences:  Code Status: Full Code    Final Active Diagnoses:    Diagnosis Date Noted POA    PRINCIPAL PROBLEM:  Pneumonia [J18.9] 03/22/2022 Yes    HLD (hyperlipidemia) [E78.5] 03/22/2022 Yes    Hypertension [I10] 03/14/2014 Yes      Problems Resolved During this Admission:     Discharged Condition: Stable    Disposition: Home or Self Care    Follow Up:    Patient Instructions:   No discharge procedures on file.    Medications:  Reconciled Home Medications:      Medication List      START taking these medications    benzonatate 100 MG capsule  Commonly known as: TESSALON  Take 1 capsule (100 mg total) by mouth 3 (three) times daily as needed for Cough.     levoFLOXacin 750 MG tablet  Commonly known as: LEVAQUIN  Take 1 tablet (750 mg total) by mouth once daily. for 4 days     oseltamivir 75 MG capsule  Commonly known as: TAMIFLU  Take 1 capsule (75 mg total) by mouth 2 (two) times daily. for 4 days        CONTINUE taking these medications    amLODIPine 10 MG tablet  Commonly known as: NORVASC  Take 1 tablet (10 mg total) by mouth once daily.     atorvastatin 20 MG tablet  Commonly known as: LIPITOR  Take 1 tablet (20 mg total) by mouth once daily.     hydroCHLOROthiazide 25 MG tablet  Commonly known as: HYDRODIURIL  Take 1 tablet (25 mg total) by mouth once daily.     lisinopriL 20 MG tablet  Commonly known as: PRINIVIL,ZESTRIL  Take 1 tablet (20 mg total) by mouth once daily.            Indwelling Lines/Drains at time of discharge:   Lines/Drains/Airways     None                 Time spent on the discharge of patient: 30 minutes       Anni Roman MD  [facility] PGY-3  03/23/2022

## 2022-03-23 NOTE — PLAN OF CARE
PT is A&Ox4. Meds administered as ordered per MAR. PT reported headache, MD notified, stated to give early dose of tylenol. No C/O pain or N/V. Vitals stable, safety is maintained...will continue to monitor.

## 2022-03-23 NOTE — PROGRESS NOTES
[facility]                    Pharmacy       Discharge Medication Education    Patient ACCEPTED medication education. Pharmacy has provided education on the name, indication, and possible side effects of the medication(s) prescribed, using teach-back method.     The following medications have also been discussed, during this admission.        Medication List        START taking these medications      benzonatate 100 MG capsule  Commonly known as: TESSALON  Take 1 capsule (100 mg total) by mouth 3 (three) times daily as needed for Cough.     levoFLOXacin 750 MG tablet  Commonly known as: LEVAQUIN  Take 1 tablet (750 mg total) by mouth once daily. for 4 days     oseltamivir 75 MG capsule  Commonly known as: TAMIFLU  Take 1 capsule (75 mg total) by mouth 2 (two) times daily. for 4 days            CONTINUE taking these medications      amLODIPine 10 MG tablet  Commonly known as: NORVASC  Take 1 tablet (10 mg total) by mouth once daily.     atorvastatin 20 MG tablet  Commonly known as: LIPITOR  Take 1 tablet (20 mg total) by mouth once daily.     hydroCHLOROthiazide 25 MG tablet  Commonly known as: HYDRODIURIL  Take 1 tablet (25 mg total) by mouth once daily.     lisinopriL 20 MG tablet  Commonly known as: PRINIVIL,ZESTRIL  Take 1 tablet (20 mg total) by mouth once daily.               Where to Get Your Medications        These medications were sent to [facility]  [address] [place] [address]      Hours: Mon-Fri, 8a-5:30p Phone: [phone]   benzonatate 100 MG capsule  levoFLOXacin 750 MG tablet  oseltamivir 75 MG capsule          Thank you  Elina Hightower, PharmD  [phone]

## 2022-03-23 NOTE — PROGRESS NOTES
[facility]                   Pharmacy  Pharmacy Vancomycin/AG Sign-off    Therapy with vancomycin completed and/or consult discontinued by provider.  Pharmacy will sign off, please re-consult as needed. Thank you for allowing us to participate in this patient's care.     Jay Pedraza, PharmD  [phone]

## 2022-03-23 NOTE — PLAN OF CARE
Discharge order noted.    discharge nurse will go over home medications and reasons for medications and final discharge instructions.      Future Appointments   Date Time Provider Department Center   4/1/2022 10:00 AM Jeffrey Cunha PA-C [facility]   4/21/2022  9:00 AM [facility]        03/23/22 1035   Final Note   Assessment Type Final Discharge Note   Anticipated Discharge Disposition Home   What phone number can be called within the next 1-3 days to see how you are doing after discharge? [phone]   Hospital Resources/Appts/Education Provided Appointments scheduled by Navigator/Coordinator;Appointments scheduled and added to AVS   Post-Acute Status   Discharge Delays None known at this time

## 2022-03-27 ENCOUNTER — NURSE TRIAGE (OUTPATIENT)
Dept: ADMINISTRATIVE | Facility: CLINIC | Age: 42
End: 2022-03-27
Payer: MEDICAID

## 2022-03-27 ENCOUNTER — HOSPITAL ENCOUNTER (EMERGENCY)
Facility: HOSPITAL | Age: 42
Discharge: HOME | End: 2022-03-27
Attending: EMERGENCY MEDICINE
Payer: MEDICAID

## 2022-03-27 VITALS
HEIGHT: 71 IN | HEART RATE: 86 BPM | DIASTOLIC BLOOD PRESSURE: 58 MMHG | RESPIRATION RATE: 20 BRPM | OXYGEN SATURATION: 98 % | SYSTOLIC BLOOD PRESSURE: 113 MMHG | BODY MASS INDEX: 49.66 KG/M2

## 2022-03-27 DIAGNOSIS — R07.89 LEFT-SIDED CHEST WALL PAIN: Primary | ICD-10-CM

## 2022-03-27 DIAGNOSIS — J18.9 COMMUNITY ACQUIRED PNEUMONIA OF LEFT LOWER LOBE OF LUNG: ICD-10-CM

## 2022-03-27 DIAGNOSIS — R07.9 CHEST PAIN: ICD-10-CM

## 2022-03-27 LAB
ALBUMIN SERPL BCP-MCNC: 3.6 G/DL (ref 3.5–5.2)
ALP SERPL-CCNC: 72 U/L (ref 55–135)
ALT SERPL W/O P-5'-P-CCNC: 16 U/L (ref 10–44)
ANION GAP SERPL CALC-SCNC: 10 MMOL/L (ref 8–16)
AST SERPL-CCNC: 12 U/L (ref 10–40)
B-HCG UR QL: NEGATIVE
BACTERIA BLD CULT: NORMAL
BACTERIA BLD CULT: NORMAL
BILIRUB SERPL-MCNC: 0.3 MG/DL (ref 0.1–1)
BUN SERPL-MCNC: 15 MG/DL (ref 6–20)
CALCIUM SERPL-MCNC: 10 MG/DL (ref 8.7–10.5)
CHLORIDE SERPL-SCNC: 106 MMOL/L (ref 95–110)
CO2 SERPL-SCNC: 22 MMOL/L (ref 23–29)
CREAT SERPL-MCNC: 0.9 MG/DL (ref 0.5–1.4)
CTP QC/QA: YES
ERYTHROCYTE [DISTWIDTH] IN BLOOD BY AUTOMATED COUNT: 13.8 % (ref 11.5–14.5)
EST. GFR  (AFRICAN AMERICAN): >60 ML/MIN/1.73 M^2
EST. GFR  (NON AFRICAN AMERICAN): >60 ML/MIN/1.73 M^2
GLUCOSE SERPL-MCNC: 119 MG/DL (ref 70–110)
HCT VFR BLD AUTO: 34.3 % (ref 37–48.5)
HGB BLD-MCNC: 11.1 G/DL (ref 12–16)
MCH RBC QN AUTO: 30.7 PG (ref 27–31)
MCHC RBC AUTO-ENTMCNC: 32.4 G/DL (ref 32–36)
MCV RBC AUTO: 95 FL (ref 82–98)
PLATELET # BLD AUTO: 371 K/UL (ref 150–450)
PMV BLD AUTO: 10.5 FL (ref 9.2–12.9)
POTASSIUM SERPL-SCNC: 3.9 MMOL/L (ref 3.5–5.1)
PROT SERPL-MCNC: 7.1 G/DL (ref 6–8.4)
RBC # BLD AUTO: 3.62 M/UL (ref 4–5.4)
SODIUM SERPL-SCNC: 138 MMOL/L (ref 136–145)
TROPONIN I SERPL DL<=0.01 NG/ML-MCNC: <0.006 NG/ML (ref 0–0.03)
WBC # BLD AUTO: 9.66 K/UL (ref 3.9–12.7)

## 2022-03-27 PROCEDURE — 99285 EMERGENCY DEPT VISIT HI MDM: CPT | Mod: 25

## 2022-03-27 PROCEDURE — 80053 COMPREHEN METABOLIC PANEL: CPT | Performed by: EMERGENCY MEDICINE

## 2022-03-27 PROCEDURE — 84484 ASSAY OF TROPONIN QUANT: CPT | Performed by: EMERGENCY MEDICINE

## 2022-03-27 PROCEDURE — 93010 ELECTROCARDIOGRAM REPORT: CPT | Mod: ,,, | Performed by: INTERNAL MEDICINE

## 2022-03-27 PROCEDURE — 81025 URINE PREGNANCY TEST: CPT | Performed by: EMERGENCY MEDICINE

## 2022-03-27 PROCEDURE — 93005 ELECTROCARDIOGRAM TRACING: CPT

## 2022-03-27 PROCEDURE — 85027 COMPLETE CBC AUTOMATED: CPT | Performed by: EMERGENCY MEDICINE

## 2022-03-27 PROCEDURE — 93010 EKG 12-LEAD: ICD-10-PCS | Mod: ,,, | Performed by: INTERNAL MEDICINE

## 2022-03-27 NOTE — ED NOTES
x2 unsuccessful, IV attempts. Unable to thread. Another Rn notified and currently in room re-attempting. Will continue to monitor.

## 2022-03-27 NOTE — ED PROVIDER NOTES
Encounter Date: 3/27/2022       History     Chief Complaint   Patient presents with    Chest Pain     Left lateral and anterior chest wall pain. +dx of pneumonia on left lung and recent admission. Patient stated that the pain in left lung improved and she was discharged but the pain has now returned and described as a nagging pain with intermittent episodes of sob. VSS. O2 saturations 97%      Dirk Issa is a 42 y.o. female who  has a past medical history of Hypertension.    The patient presents to the ED due to L-sided chest pain.   Patient reports she was recently admitted for pneumonia. She states she felt completely better on discharge. She was discharged with ABX, and reports taking them every day, and still has one dose left. She describes a dull discomfort to the L side of her chest. The pain is not as severe as when she was admitted. She describes worse pain with taking a deep breath. No fever, cough, N/V/D, SOB, or any other concerns.         Review of patient's allergies indicates:  No Known Allergies  Past Medical History:   Diagnosis Date    Hypertension      No past surgical history on file.  Family History   Problem Relation Age of Onset    Hypertension Maternal Grandmother     Hypertension Maternal Grandfather      Social History     Tobacco Use    Smoking status: Never Smoker    Smokeless tobacco: Never Used   Substance Use Topics    Alcohol use: Yes     Comment: occ    Drug use: No     Review of Systems   Constitutional: Negative for chills and fever.   HENT: Negative for sore throat.    Respiratory: Negative for cough and shortness of breath.    Cardiovascular: Positive for chest pain. Negative for palpitations.   Gastrointestinal: Negative for nausea and vomiting.   Genitourinary: Negative for dysuria, frequency and urgency.   Musculoskeletal: Negative for back pain.   Skin: Negative for rash and wound.   Neurological: Negative for syncope and weakness.   Hematological: Does not  bruise/bleed easily.   Psychiatric/Behavioral: Negative for agitation, behavioral problems and confusion.       Physical Exam     Initial Vitals [03/27/22 0930]   BP Pulse Resp Temp SpO2   (!) 140/79 94 20 -- 97 %      MAP       --         Physical Exam    Nursing note and vitals reviewed.  Constitutional: She appears well-developed and well-nourished. She is not diaphoretic. No distress.   Well-appearing, no distress.   HENT:   Head: Normocephalic and atraumatic.   Mouth/Throat: Oropharynx is clear and moist.   Eyes: EOM are normal. Pupils are equal, round, and reactive to light.   Neck: No tracheal deviation present.   Cardiovascular: Normal rate, regular rhythm, normal heart sounds and intact distal pulses.   Pulmonary/Chest: Effort normal and breath sounds normal. No stridor. No tachypnea. No respiratory distress. She has no decreased breath sounds. She has no wheezes. She has no rhonchi. She has no rales.   Lungs clear.   Abdominal: Abdomen is soft. Bowel sounds are normal. She exhibits no distension and no mass. There is no abdominal tenderness.   Musculoskeletal:         General: No edema. Normal range of motion.     Neurological: She is alert and oriented to person, place, and time. She has normal strength. No cranial nerve deficit or sensory deficit.   Skin: Skin is warm and dry. Capillary refill takes less than 2 seconds. No pallor.   Psychiatric: She has a normal mood and affect. Her behavior is normal. Thought content normal.         ED Course   Procedures  Labs Reviewed   CBC WITHOUT DIFFERENTIAL - Abnormal; Notable for the following components:       Result Value    RBC 3.62 (*)     Hemoglobin 11.1 (*)     Hematocrit 34.3 (*)     All other components within normal limits   COMPREHENSIVE METABOLIC PANEL - Abnormal; Notable for the following components:    CO2 22 (*)     Glucose 119 (*)     All other components within normal limits   TROPONIN I   POCT URINE PREGNANCY     EKG Readings: (Independently  Interpreted)   Initial Reading: No STEMI. Previous EKG: Compared with most recent EKG Rhythm: Normal Sinus Rhythm.   Normal sinus rhythm, rate 86, T-wave inversions lateral leads, no ST elevations or other signs of ischemia, normal intervals.  Compared prior from March 2022, T-wave changes have improved.       Imaging Results          X-Ray Chest PA And Lateral (Final result)  Result time 03/27/22 10:55:53    Final result by Shirley Clements MD (03/27/22 10:55:53)                 Impression:      Slight better aeration of the left mid lung zone compared to the prior exam    Follow-up PA and lateral chest radiographs after treatment completed advised to ensure complete resolution.      Electronically signed by: Shirley Clements MD  Date:    03/27/2022  Time:    10:55             Narrative:    EXAMINATION:  XR CHEST PA AND LATERAL    TECHNIQUE:  PA and lateral views of the chest were performed.    COMPARISON:  03/22/2022    FINDINGS:  The cardiac silhouette is slightly prominent    The pulmonary vascularity is normal.    The pulmonary mo appear normal bilaterally.    Patchy increased attenuation left mid lung zone, better aerated compared to the prior exam.    No pleural effusion, no pneumothorax.    The osseous structures appear within normal limits for age.                                 Medications - No data to display  Medical Decision Making:   History:   Old Medical Records: I decided to obtain old medical records.  Old Records Summarized: records from clinic visits and other records.       <> Summary of Records: Admitted 3/22-3/23 for fever, found to have Flu A and leukocytosis as well as infiltrate on CXR. Discharged on tamiflu and levaquin.   Initial Assessment:   43 yo F with constant aching chest pain to the L chest wall for the last few days.  Vitals and exam benign, lungs clear.  Will obtain labs, EKG, CXR, reassess.  Differential Diagnosis:   Differential Diagnosis includes, but is not limited  to:  ACS/MI, PE, aortic dissection, pneumothorax, cardiac tamponade, pericarditis/myocarditis, pneumonia, infection/abscess, lung mass, trauma/fracture, costochondritis/pleurisy, MSK pain/contusion, GERD, biliary disease, pancreatitis, anemia    Independently Interpreted Test(s):   I have ordered and independently interpreted X-rays - see prior notes.  I have ordered and independently interpreted EKG Reading(s) - see prior notes  Clinical Tests:   Lab Tests: Reviewed and Ordered  Radiological Study: Ordered and Reviewed  Medical Tests: Reviewed and Ordered  ED Management:  EKG without acute ischemia or arrhythmia.  CXR reveals improved aeration and resolving left midlung infiltrate, no other infiltrates or other acute process.  Labs unremarkable, troponin negative.  Vitals have remained stable.  Afebrile, heart rate blood pressure within normal limits.  Patient informed of findings and reassured, instructed to continue antibiotics until completion, and follow-up with PCP if symptoms do not improve.    On re-evaluation, the patient's status has improved.  After complete ED evaluation, clinical impression is most consistent with chest wall pain.  PCP follow-up within 2-3 days was recommended.    After taking into careful account the patient's history, physical exam findings, as well as empirical and objective data obtained throughout ED workup, I feel no emergent medical condition has been identified. No further evaluation or admission was felt to be required, and the patient is stable for discharge from the ED. The patient and any additional family present were updated with test results, overall clinical impression, and recommended further plan of care, including discharge instructions as provided and outpatient follow-up for continued evaluation and management as needed. All questions were answered. The patient expressed understanding and agreed with current plan for discharge and follow-up plan of care. Strict ED  return precautions were provided, including return/worsening of current symptoms, new symptoms, or any other concerns.                        Clinical Impression:   Final diagnoses:  [R07.9] Chest pain  [R07.89] Left-sided chest wall pain (Primary)  [J18.9] Community acquired pneumonia of left lower lobe of lung            ED Disposition Condition    Discharge Stable        ED Prescriptions     None        Follow-up Information     Follow up With Specialties Details Why Contact Info Additional Information    [facility] Schedule an appointment as soon as possible for a visit   [address], [address]  [facility] [place] [address] [phone] Please park in [address] or [address] and use [address] pryor. Take Medical Office Bldg. elevators.           Anthony Stevenson MD  03/27/22 8551

## 2022-03-29 DIAGNOSIS — I10 ESSENTIAL HYPERTENSION: ICD-10-CM

## 2022-03-31 RX ORDER — AMLODIPINE BESYLATE 10 MG/1
10 TABLET ORAL DAILY
Qty: 30 TABLET | Refills: 0 | Status: SHIPPED | OUTPATIENT
Start: 2022-03-31 | End: 2022-04-01 | Stop reason: SDUPTHER

## 2022-03-31 RX ORDER — LISINOPRIL 20 MG/1
20 TABLET ORAL DAILY
Qty: 30 TABLET | Refills: 0 | Status: SHIPPED | OUTPATIENT
Start: 2022-03-31 | End: 2022-04-01 | Stop reason: SDUPTHER

## 2022-03-31 RX ORDER — HYDROCHLOROTHIAZIDE 25 MG/1
25 TABLET ORAL DAILY
Qty: 30 TABLET | Refills: 0 | Status: SHIPPED | OUTPATIENT
Start: 2022-03-31 | End: 2022-04-01 | Stop reason: SDUPTHER

## 2022-04-01 ENCOUNTER — OFFICE VISIT (OUTPATIENT)
Dept: FAMILY MEDICINE | Facility: HOSPITAL | Age: 42
End: 2022-04-01
Attending: FAMILY MEDICINE
Payer: MEDICAID

## 2022-04-01 VITALS — BODY MASS INDEX: 41.02 KG/M2 | HEIGHT: 71 IN | WEIGHT: 293 LBS

## 2022-04-01 DIAGNOSIS — J18.9 PNEUMONIA DUE TO INFECTIOUS ORGANISM, UNSPECIFIED LATERALITY, UNSPECIFIED PART OF LUNG: Primary | ICD-10-CM

## 2022-04-01 DIAGNOSIS — I10 ESSENTIAL HYPERTENSION: ICD-10-CM

## 2022-04-01 PROCEDURE — 99213 OFFICE O/P EST LOW 20 MIN: CPT | Performed by: PHYSICIAN ASSISTANT

## 2022-04-01 RX ORDER — AMLODIPINE BESYLATE 10 MG/1
10 TABLET ORAL DAILY
Qty: 90 TABLET | Refills: 3 | Status: SHIPPED | OUTPATIENT
Start: 2022-04-01 | End: 2023-07-18 | Stop reason: SDUPTHER

## 2022-04-01 RX ORDER — LISINOPRIL 20 MG/1
20 TABLET ORAL DAILY
Qty: 90 TABLET | Refills: 3 | Status: SHIPPED | OUTPATIENT
Start: 2022-04-01 | End: 2023-07-18 | Stop reason: SDUPTHER

## 2022-04-01 RX ORDER — HYDROCHLOROTHIAZIDE 25 MG/1
25 TABLET ORAL DAILY
Qty: 90 TABLET | Refills: 3 | Status: SHIPPED | OUTPATIENT
Start: 2022-04-01 | End: 2023-07-18 | Stop reason: SDUPTHER

## 2022-05-20 NOTE — PROGRESS NOTES
Subjective:       Patient ID: Dirk Issa is a 42 y.o. female.    Chief Complaint: Hospital Follow Up    43 yo F w/ PMHx of HTN and HLD here today for hospital follow up. Recently admitted with possible PNA. Presented with fevers, chills, body aches, fatigue, and decreased PO intake. Also with chest pain on deep inspiration. Started on Tamiflu for influenza A and broad spectrum antibiotics for pneumonia. Pt remained afebrile, hemodynamically stable, maintaining O2 stats with improvement in symptoms. Broad spectrum antibiotic therapy narrowed to PO Levaquin for treatment of CAP. On day of discharge, pt stable and well-appearing. Plan for discharge with Tamiflu (x5 days total) and Levaquin 750 PO daily (x7 days total).    Today Ms. Issa is unaccompanied during the visit. Overall she is feeling much better. Denies continued fever, chills, CP, SOB. Still a little fatigued with an occasional cough. Taking medications as prescribed. Requesting refills of her BP medications today, as well as cough medication.     Review of Systems   All other systems reviewed and are negative.      Objective:      Physical Exam  Vitals and nursing note reviewed.   Constitutional:       General: She is not in acute distress.     Appearance: She is well-developed. She is not diaphoretic.   HENT:      Head: Normocephalic and atraumatic.   Eyes:      Conjunctiva/sclera: Conjunctivae normal.   Neck:      Trachea: No tracheal deviation.   Cardiovascular:      Rate and Rhythm: Normal rate and regular rhythm.      Heart sounds: Normal heart sounds. No murmur heard.  Pulmonary:      Effort: Pulmonary effort is normal. No respiratory distress.      Breath sounds: Normal breath sounds. No wheezing.   Chest:      Chest wall: No tenderness.   Abdominal:      General: Bowel sounds are normal. There is no distension.      Palpations: Abdomen is soft.      Tenderness: There is no abdominal tenderness. There is no guarding.   Musculoskeletal:          General: No tenderness or deformity. Normal range of motion.   Skin:     General: Skin is warm and dry.   Neurological:      Mental Status: She is alert and oriented to person, place, and time.      Coordination: Coordination normal.   Psychiatric:         Behavior: Behavior normal.         Thought Content: Thought content normal.         Assessment:       1. Pneumonia due to infectious organism, unspecified laterality, unspecified part of lung    2. Essential hypertension        Plan:       Pneumonia due to infectious organism, unspecified laterality, unspecified part of lung   -Resolved. Treated fully for CAP with Levaquin.    Essential hypertension   -Refill BP meds. Monitor at home, record, and bring to follow up appt.   -     amLODIPine (NORVASC) 10 MG tablet; Take 1 tablet (10 mg total) by mouth once daily.  Dispense: 90 tablet; Refill: 3  -     hydroCHLOROthiazide (HYDRODIURIL) 25 MG tablet; Take 1 tablet (25 mg total) by mouth once daily.  Dispense: 90 tablet; Refill: 3  -     lisinopriL (PRINIVIL,ZESTRIL) 20 MG tablet; Take 1 tablet (20 mg total) by mouth once daily.  Dispense: 90 tablet; Refill: 3      Future Appointments   Date Time Provider Department Center   6/30/2022 10:00 AM Geoff Tinsley MD [facility]

## 2022-06-07 NOTE — TELEPHONE ENCOUNTER
Pt calling s/p recent discharge from the hospital with pneumonia, stating she is having L sided upper CP that is constantly there and gets worse with breathing. Pt has HX of HTN. Per protocol advised to call 911 NOW. Pt verbalized understanding but states she will get dressed and bring herself in.     Reason for Disposition   [1] Chest pain lasts > 5 minutes AND [2] age > 30 AND [3] one or more cardiac risk factors (e.g., diabetes, high blood pressure, high cholesterol, smoker, or strong family history of heart disease)    Additional Information   Negative: SEVERE difficulty breathing (e.g., struggling for each breath, speaks in single words)   Negative: Difficult to awaken or acting confused (e.g., disoriented, slurred speech)   Negative: Shock suspected (e.g., cold/pale/clammy skin, too weak to stand, low BP, rapid pulse)   Negative: Passed out (i.e., fainted, collapsed and was not responding)   Negative: [1] Chest pain lasts > 5 minutes AND [2] age > 44    Protocols used: CHEST PAIN-A-AH      
Pt. walk in c/o diarrhea that is black x 3 hours with generalized abd. pain. Denies n/v, dizziness, CP.

## 2023-07-18 ENCOUNTER — OFFICE VISIT (OUTPATIENT)
Dept: FAMILY MEDICINE | Facility: HOSPITAL | Age: 43
End: 2023-07-18
Payer: MEDICAID

## 2023-07-18 VITALS
BODY MASS INDEX: 41.02 KG/M2 | HEIGHT: 71 IN | SYSTOLIC BLOOD PRESSURE: 153 MMHG | HEART RATE: 78 BPM | WEIGHT: 293 LBS | DIASTOLIC BLOOD PRESSURE: 93 MMHG

## 2023-07-18 DIAGNOSIS — I10 ESSENTIAL HYPERTENSION: ICD-10-CM

## 2023-07-18 DIAGNOSIS — E66.01 CLASS 3 SEVERE OBESITY WITH BODY MASS INDEX (BMI) OF 45.0 TO 49.9 IN ADULT, UNSPECIFIED OBESITY TYPE, UNSPECIFIED WHETHER SERIOUS COMORBIDITY PRESENT: ICD-10-CM

## 2023-07-18 DIAGNOSIS — Z12.31 ENCOUNTER FOR SCREENING MAMMOGRAM FOR MALIGNANT NEOPLASM OF BREAST: ICD-10-CM

## 2023-07-18 DIAGNOSIS — F17.200 TOBACCO USE DISORDER: ICD-10-CM

## 2023-07-18 DIAGNOSIS — R73.03 PRE-DIABETES: Primary | ICD-10-CM

## 2023-07-18 PROCEDURE — 99214 OFFICE O/P EST MOD 30 MIN: CPT

## 2023-07-18 RX ORDER — HYDROCHLOROTHIAZIDE 25 MG/1
25 TABLET ORAL DAILY
Qty: 90 TABLET | Refills: 3 | Status: SHIPPED | OUTPATIENT
Start: 2023-07-18 | End: 2024-07-17

## 2023-07-18 RX ORDER — ATORVASTATIN CALCIUM 20 MG/1
20 TABLET, FILM COATED ORAL DAILY
Qty: 90 TABLET | Refills: 3 | Status: SHIPPED | OUTPATIENT
Start: 2023-07-18 | End: 2024-07-17

## 2023-07-18 RX ORDER — AMLODIPINE BESYLATE 10 MG/1
10 TABLET ORAL DAILY
Qty: 90 TABLET | Refills: 3 | Status: SHIPPED | OUTPATIENT
Start: 2023-07-18 | End: 2024-07-17

## 2023-07-18 RX ORDER — LISINOPRIL 20 MG/1
20 TABLET ORAL DAILY
Qty: 90 TABLET | Refills: 3 | Status: SHIPPED | OUTPATIENT
Start: 2023-07-18

## 2023-07-18 NOTE — PROGRESS NOTES
"[facility]    Subjective:     Dirk Issa is a 43 y.o. year old female with PMHx of HTN, pre-diabetes, HLD  who presents to clinic for follow up.    HTN: BP today 165/95. Patient has not been taking BP at home. Patient denies any SOB, lightheadedness, dizziness, palpitations, chest pain, or vision changes. Patient endorses compliance with medication regimen, including HCTZ 25, Amlodipine 10 mg, Lisinopril 20 mg.     Has not been taking Atorvastatin, states was told that she does not need to.    STOP-BANG intermediate risk 4    Last pap smear 2014 with her last pregnancy and is due; denies hx of abnormal pap smear  Mammogram never had, ordered today    Smoker - occasional when drinking  THC daily    Patient Active Problem List    Diagnosis Date Noted    Pneumonia 03/22/2022    HLD (hyperlipidemia) 03/22/2022    Class 3 severe obesity with serious comorbidity and body mass index (BMI) of 50.0 to 59.9 in adult 06/05/2014    Hypertension 03/14/2014        Review of patient's allergies indicates:  No Known Allergies     Past Medical History:   Diagnosis Date    Hypertension       No past surgical history on file.   Family History   Problem Relation Age of Onset    Hypertension Maternal Grandmother     Hypertension Maternal Grandfather       Social History     Tobacco Use    Smoking status: Never    Smokeless tobacco: Never   Substance Use Topics    Alcohol use: Yes     Comment: occ        Objective:     Vitals:    07/18/23 1014 07/18/23 1043   BP: (!) 165/95 (!) 153/93   Pulse: 78 78   Weight: (!) 157.9 kg (348 lb 1.7 oz)    Height: 5' 11" (1.803 m)      BMI: 48.55    Physical Exam  Vitals reviewed.   Constitutional:       General: She is not in acute distress.     Appearance: She is not toxic-appearing.   Cardiovascular:      Rate and Rhythm: Normal rate and regular rhythm.      Pulses: Normal pulses.      Heart sounds: Normal heart sounds.   Pulmonary:      Effort: Pulmonary effort is normal. No " respiratory distress.      Breath sounds: Normal breath sounds. No wheezing, rhonchi or rales.   Musculoskeletal:         General: Normal range of motion.   Skin:     General: Skin is warm.   Neurological:      Mental Status: She is alert. Mental status is at baseline.   Psychiatric:         Mood and Affect: Mood normal.         Behavior: Behavior normal.         Thought Content: Thought content normal.       Assessment/Plan:     Dirk Issa is a 43 y.o. year old female with PMHx of HTN, pre-diabetes, HLD who presents to clinic for follow up. BP not controlled, but did run out of her medicine today.     1. Essential hypertension  - Not controlled  - Advised patient to bring BP log to next visit in 1 month as we may need to adjust her medications  - Continue lisinopriL (PRINIVIL,ZESTRIL) 20 MG daily  - Continue amLODIPine (NORVASC) 10 MG daily  - Continue hydroCHLOROthiazide (HYDRODIURIL) 25 MG daily  - CBC Auto Differential; Future  - Comprehensive Metabolic Panel; Future    2. Pre-diabetes  - Will check Hemoglobin A1C today  - Last A1c 5.8 2/2022, may benefit from Metformin     3. Class 3 severe obesity with body mass index (BMI) of 45.0 to 49.9 in adult, unspecified obesity type, unspecified whether serious comorbidity present  - TSH; Future  - Ambulatory referral/consult to Sleep Disorders due to intermediate risk and BMI >35; also with uncontrolled HTN    4. Encounter for screening mammogram for malignant neoplasm of breast  - Mammo Digital Screening Bilat w/ Axel; Future    5. Tobacco use disorder  Patient with current history of tobacco use. Encouraged smoking cessation during visit. Assessed patient's willingness to quit. Patient states some willingness to quit at this time. Will continue to  patient on smoking cessation and provide any needed resources, including www.quitwithusla.org and 1-800-QUIT-NOW .    Follow-up: 1 month for HTN and pap smear    A total of 20 minutes was spent on patient  care during this encounter which included chart review, examining the patient, formulating a treatment plan and documentation.     Medical decision making straight forward and not complex during this visit.     Case discussed with staff: Dr. Kristen Galindo MD  [facility], PGY-2  07/18/2023

## 2023-07-19 NOTE — PROGRESS NOTES
I assume primary medical responsibility for this patient. I have reviewed the history, physical, and assessement & treatment plan with the resident and agree that the care is reasonable and necessary. This service has been performed by a resident with the presence of a teaching physician under the primary care exception. If necessary, an addendum of additional findings or evaluation beyond the resident documentation will be noted below.    Chronic disease management provided.      Brenda Peterson MD    [facility]

## 2023-07-21 ENCOUNTER — TELEPHONE (OUTPATIENT)
Dept: FAMILY MEDICINE | Facility: HOSPITAL | Age: 43
End: 2023-07-21
Payer: MEDICAID

## 2024-05-06 VITALS
BODY MASS INDEX: 48.21 KG/M2 | RESPIRATION RATE: 18 BRPM | SYSTOLIC BLOOD PRESSURE: 143 MMHG | WEIGHT: 293 LBS | HEART RATE: 64 BPM | OXYGEN SATURATION: 100 % | DIASTOLIC BLOOD PRESSURE: 80 MMHG | TEMPERATURE: 98 F

## 2024-05-06 PROCEDURE — 99282 EMERGENCY DEPT VISIT SF MDM: CPT

## 2024-05-07 ENCOUNTER — HOSPITAL ENCOUNTER (EMERGENCY)
Facility: HOSPITAL | Age: 44
Discharge: HOME | End: 2024-05-07
Attending: STUDENT IN AN ORGANIZED HEALTH CARE EDUCATION/TRAINING PROGRAM
Payer: MEDICAID

## 2024-05-07 DIAGNOSIS — N93.9 ABNORMAL VAGINAL BLEEDING: Primary | ICD-10-CM

## 2024-05-07 DIAGNOSIS — N93.9 ABNORMAL VAGINAL BLEEDING: ICD-10-CM

## 2024-05-07 LAB
B-HCG UR QL: NEGATIVE
CTP QC/QA: YES

## 2024-05-07 PROCEDURE — 81025 URINE PREGNANCY TEST: CPT | Performed by: STUDENT IN AN ORGANIZED HEALTH CARE EDUCATION/TRAINING PROGRAM

## 2024-05-07 NOTE — ED PROVIDER NOTES
Encounter Date: 2024       History     Chief Complaint   Patient presents with    Vaginal Bleeding     Patient reports vaginal for ten days. She states she has an IUD that she thinks is . She states the bleeding is like her normal period. Denies abd pain, urinary symptoms.      HPI  44-year-old female history of hypertension, elevated BMI, no other significant medical history, presents brought in by self through triage for 10 days of vaginal bleeding.  States that her menses are regular month to month and are normally only 5 days of bleeding, she has not having any abdominal pain, notes slow bleeding approximately 1-2 pads a day during this interval.  She denies any bleeding of 1 pad per hour or more, lightheadedness, abdominal pain, irregular vaginal discharge, or any other systemic or infectious symptoms or other acute complaints.  She is sexually active and has an IUD that was placed 10 years ago.  She does not have a regular OB gyn.    Review of patient's allergies indicates:  No Known Allergies  Past Medical History:   Diagnosis Date    Hypertension      No past surgical history on file.  Family History   Problem Relation Name Age of Onset    Hypertension Maternal Grandmother      Hypertension Maternal Grandfather       Social History     Tobacco Use    Smoking status: Never    Smokeless tobacco: Never   Substance Use Topics    Alcohol use: Yes     Comment: occ    Drug use: No     Review of Systems  Complete review of systems was conducted and was negative except as per HPI and as marked    Physical Exam     Initial Vitals [24 2254]   BP Pulse Resp Temp SpO2   (!) 143/80 64 18 97.8 °F (36.6 °C) 100 %      MAP       --         Physical Exam  Physical  General: Awake, alert, no acute distress  Head: Normocephalic, atraumatic  Neck: Trachea midline, full range of motion, no meningismus  ENT: Atraumatic, Airway Patent  Cardio: Regular Rate, Regular Rhythm,  Capillary refill normal  Chest:  Atraumatic, No respiratory distress no use of accessory muscles to breath, normal rate  Abdomen: Soft, Nontender, no involuntary guarding, rigidity, or rebound.  Upper Ext: Atraumatic, Inspection normal, no swelling  Lower Ext: Atraumatic, Inspection normal, no swelling  Neuro: No gross cranial nerve abnormality, no lateralization, no gross sensory or motor deficits    ED Course   Procedures  Labs Reviewed   POCT URINE PREGNANCY          Imaging Results    None          Medications - No data to display  Medical Decision Making  Amount and/or Complexity of Data Reviewed  Labs: ordered.    44-year-old female history of hypertension, elevated BMI, no other significant medical history, presents brought in by self through triage for 10 days of vaginal bleeding.  States that her menses are regular month to month and are normally only 5 days of bleeding, she has not having any abdominal pain, notes slow bleeding approximately 1-2 pads a day during this interval.  She denies any bleeding of 1 pad per hour or more, lightheadedness, abdominal pain, irregular vaginal discharge, or any other systemic or infectious symptoms or other acute complaints.  She is sexually active and has an IUD that was placed 10 years ago.  She does not have a regular OB gyn.    Patient is hemodynamically stable, no acute distress, well appearing and ambulatory.  She has not clinically significantly anemic and clearly would not be requiring blood transfusion, admission, and is not having any irregular mouth that would require an urgent D&C.  Her mother was 40 when she went into menopause and this may be 1st presentation of this.  I had an extensive discussion with the patient including shared decision-making in terms of studies from the emergency department, including gualberto discussion that complete in proper workup for this would include saline infusion ultrasound, endometrial biopsy, and hormone check such as FSH, all of which are routinely done  outpatient with OBGYN and would not benefit from, require, or be able to receive through the emergency department.  Patient was provided with referral for OB gyn follow-up,    U preg negative.  No other EMC by [facility].  need for follow-up regarding visit today, need to call for follow-up, expected course, and return precautions were all discussed at length in my traditional manner to which patient verbalized understanding and agrees and all questions were answered. Patient is well appearing and ambulatory at time of discharge.                                  Clinical Impression:  Final diagnoses:  [N93.9] Abnormal vaginal bleeding (Primary)          ED Disposition Condition    Discharge Stable          ED Prescriptions    None       Follow-up Information       Follow up With Specialties Details Why Contact Info Additional Information    Lorraine Bob MD Obstetrics and Gynecology In 2 days  [address]  [address]  [facility] [address]  [phone]       [place] [facility] Obstetrics and Gynecology In 2 days  [address]  [address]  [facility] [address] [phone] Please park in [address] or [address] and use Zelda pryor. Take Medical Office Bldg. elevators.             Norman Salas MD  05/07/24 0108

## 2024-05-07 NOTE — Clinical Note
"Dirk Lo" Luis Manuel was seen and treated in our emergency department on 5/6/2024.  She may return to work on 05/09/2024.       If you have any questions or concerns, please don't hesitate to call.      Norman Salas MD"

## 2024-05-07 NOTE — DISCHARGE INSTRUCTIONS
Call for follow-up appointment with OBGYN, as we discussed irregular vaginal bleeding at this age does need further evaluation by an OBGYN, with several tests that are not routinely done through the emergency room, such as an ultrasound of the uterus with saline, biopsies of the endometrium, and checking hormone levels such as FSH to check on menopause.  If you have more heavy bleeding that causes lightheadedness, irregular vaginal discharge, or start bleeding worse to the point where soaking more than 1 pad per hour continuously, return to the emergency department immediately.

## 2024-05-09 ENCOUNTER — OFFICE VISIT (OUTPATIENT)
Dept: FAMILY MEDICINE | Facility: HOSPITAL | Age: 44
End: 2024-05-09
Payer: MEDICAID

## 2024-05-09 VITALS
BODY MASS INDEX: 41.02 KG/M2 | WEIGHT: 293 LBS | SYSTOLIC BLOOD PRESSURE: 138 MMHG | HEART RATE: 90 BPM | HEIGHT: 71 IN | DIASTOLIC BLOOD PRESSURE: 80 MMHG

## 2024-05-09 DIAGNOSIS — D64.9 NORMOCYTIC ANEMIA: ICD-10-CM

## 2024-05-09 DIAGNOSIS — Z12.31 ENCOUNTER FOR SCREENING MAMMOGRAM FOR MALIGNANT NEOPLASM OF BREAST: ICD-10-CM

## 2024-05-09 DIAGNOSIS — N92.6 IRREGULAR MENSTRUAL CYCLE: Primary | ICD-10-CM

## 2024-05-09 PROCEDURE — 99214 OFFICE O/P EST MOD 30 MIN: CPT | Performed by: STUDENT IN AN ORGANIZED HEALTH CARE EDUCATION/TRAINING PROGRAM

## 2024-05-09 RX ORDER — NAPROXEN 500 MG/1
500 TABLET ORAL 2 TIMES DAILY
Qty: 6 TABLET | Refills: 0 | Status: SHIPPED | OUTPATIENT
Start: 2024-05-09 | End: 2024-05-13 | Stop reason: SDUPTHER

## 2024-05-09 NOTE — PROGRESS NOTES
Progress Note  [facility]    Subjective:      Patient ID: Dirk Issa is a 44 y.o. female    Chief Complaint: Vaginal Bleeding (2 WEEKS)    Dirk Issa is a 44-year-old female with a PMHx HTN, Hx of PID, obesity, prediabetes presenting for irregular cycles.     Endorses her cycles usually last around 5 days but have been present for the past 2 weeks. She says her cycles come every other month.  She denies any heavy cycles, painful cycles, pelvic pain, vaginal discharge, dysuria, or increased urinary frequency. From chart review, she currently has present a copper IUD that was placed in 2014.  She currently is sexually active and uses condoms. She is due for a Pap smear.              Health Maintenance         Date Due Completion Date    Hepatitis C Screening Never done ---    Cervical Cancer Screening Never done ---    Pneumococcal Vaccines (Age 0-64) (1 of 2 - PCV) Never done ---    TETANUS VACCINE Never done ---    Mammogram Never done ---    COVID-19 Vaccine (1 - 2023-24 season) Never done ---    Colorectal Cancer Screening Never done ---    Hemoglobin A1c (Prediabetes) 07/18/2024 7/18/2023    Influenza Vaccine (Season Ended) 09/01/2024 ---    Lipid Panel 02/16/2027 2/16/2022            Review of Systems   Constitutional:  Negative for fatigue.   HENT:  Negative for congestion and sore throat.    Eyes:  Negative for visual disturbance.   Respiratory:  Negative for shortness of breath.    Cardiovascular:  Negative for chest pain.   Gastrointestinal:  Negative for abdominal pain, blood in stool, constipation, diarrhea, nausea and vomiting.   Genitourinary:  Positive for vaginal bleeding. Negative for difficulty urinating, dysuria, frequency, vaginal discharge and vaginal pain.   Musculoskeletal:  Negative for arthralgias and myalgias.   Skin:  Negative for rash.   Allergic/Immunologic: Negative for environmental allergies and food allergies.   Neurological:  Negative for dizziness, weakness,  numbness and headaches.   Psychiatric/Behavioral:  Negative for dysphoric mood and sleep disturbance. The patient is not nervous/anxious.         Objective:      Vitals:    05/09/24 1056   BP: 138/80   Pulse: 90     BP Readings from Last 3 Encounters:   05/09/24 138/80   05/06/24 (!) 143/80   07/18/23 (!) 153/93     Body mass index is 48.89 kg/m².    Physical Exam  Vitals reviewed.   Constitutional:       Appearance: She is obese.   HENT:      Head: Normocephalic and atraumatic.   Eyes:      Pupils: Pupils are equal, round, and reactive to light.   Cardiovascular:      Rate and Rhythm: Normal rate and regular rhythm.   Pulmonary:      Effort: Pulmonary effort is normal.      Breath sounds: Normal breath sounds.   Abdominal:      Palpations: Abdomen is soft.      Tenderness: There is no abdominal tenderness.   Musculoskeletal:         General: Normal range of motion.      Right lower leg: No edema.      Left lower leg: No edema.   Skin:     General: Skin is warm.      Findings: No rash.   Neurological:      Mental Status: She is alert and oriented to person, place, and time.   Psychiatric:         Mood and Affect: Mood normal.         Behavior: Behavior normal.         Assessment/Plan:     Dirk Issa is a 44-year-old female with a PMHx HTN, Hx of PID, obesity, prediabetes presenting for irregular cycles.     Irregular menstrual cycle  -     POCT Urine Pregnancy - negative  -     naproxen (NAPROSYN) 500 MG tablet; Take 1 tablet (500 mg total) by mouth 2 (two) times daily. for 3 days  Dispense: 6 tablet; Refill: 0  -     will try with a short course of naproxen 500 mg twice daily and have patient follow-up in one-week for re-evaluation. From chart review, IUD was placed on 10/21/2014. Can discuss different options regarding birth control at follow-up visit. She is also due for a updated Pap smear. She may require a endometrial biopsy as obese with anovulatory bleeding and increased risk for endometrial  cancer.    Normocytic anemia  -     CBC Auto Differential; Future; Expected date: 05/09/2024    Encounter for screening mammogram for malignant neoplasm of breast  -     Mammo Digital Screening Bilat w/ Axel; Future; Expected date: 05/09/2024      Follow-up: 1-2 weeks    Polo Barakat DO  \[facility]\"" Family Medicine, PGY-3  05/14/2024      This note was partially created using POW[employer] Voice Recognition software. Typographical and content errors may occur with this process. While efforts are made to detect and correct such errors, in some cases errors will persist. For this reason, wording in this document should be considered in the proper context and not strictly verbatim

## 2024-05-13 DIAGNOSIS — N92.6 IRREGULAR MENSTRUAL CYCLE: ICD-10-CM

## 2024-05-14 RX ORDER — NAPROXEN 500 MG/1
500 TABLET ORAL 2 TIMES DAILY WITH MEALS
Qty: 60 TABLET | Refills: 0 | Status: SHIPPED | OUTPATIENT
Start: 2024-05-14 | End: 2024-06-13

## 2024-05-15 ENCOUNTER — OFFICE VISIT (OUTPATIENT)
Dept: FAMILY MEDICINE | Facility: HOSPITAL | Age: 44
End: 2024-05-15
Payer: MEDICAID

## 2024-05-15 VITALS
HEART RATE: 74 BPM | BODY MASS INDEX: 41.02 KG/M2 | HEIGHT: 71 IN | WEIGHT: 293 LBS | SYSTOLIC BLOOD PRESSURE: 142 MMHG | DIASTOLIC BLOOD PRESSURE: 81 MMHG

## 2024-05-15 DIAGNOSIS — Z30.431 IUD CHECK UP: Primary | ICD-10-CM

## 2024-05-15 DIAGNOSIS — N92.0 MENORRHAGIA WITH REGULAR CYCLE: ICD-10-CM

## 2024-05-15 PROCEDURE — 99213 OFFICE O/P EST LOW 20 MIN: CPT

## 2024-05-15 NOTE — PROGRESS NOTES
"[facility]    Subjective:     Dirk Issa is a 44 y.o. year old female with PMHx of HTN, HLD, obesity who presents to clinic for pap smear.    Was seen here on 5/9 previously with c/o prolonged bleeding for 2 weeks. She states that her prolonged menstrual cycle has resolved. It actually stopped on 5/9 when she was here. Has not had any bleeding since then. With the copper IUD in place, she has had regular cycles. Occurs monthly and usually lasts 5 days. However, this is the first time that it lasted longer than usual. Denies any vaginal discharge or pelvic pain. She states that the purpose of her IUD placed in October 2014 was only to prevent pregnancy, was not using it for any other reason. Has not tried anything else. She states she does not want any more children. In the past, when she was OB-GYN was thinking about tubal ligation but was unsure if she should do that due to concerns about a surgery. She has not had a pap smear since 2014. Denies any family history of uterine, endometrial, or ovarian cancer.     Patient Active Problem List    Diagnosis Date Noted    Pneumonia 03/22/2022    HLD (hyperlipidemia) 03/22/2022    Class 3 severe obesity with serious comorbidity and body mass index (BMI) of 50.0 to 59.9 in adult 06/05/2014    Hypertension 03/14/2014        Review of patient's allergies indicates:  No Known Allergies     Past Medical History:   Diagnosis Date    Hypertension       No past surgical history on file.   Family History   Problem Relation Name Age of Onset    Hypertension Maternal Grandmother      Hypertension Maternal Grandfather        Social History     Tobacco Use    Smoking status: Never    Smokeless tobacco: Never   Substance Use Topics    Alcohol use: Yes     Comment: occ        Objective:     Vitals:    05/15/24 1518   BP: (!) 142/81   Pulse: 74   Weight: (!) 160.3 kg (353 lb 6.4 oz)   Height: 5' 11" (1.803 m)     Body mass index is 49.29 kg/m².    GYN: External genitalia " wnl, no apparent masses or lesions, bimanual examination performed and tolerated by the patient, no cervical motion tenderness. Speculum examination performed and tolerated by the patient. Cervical os closed and without obvious lesions. No IUD strings visualized at this time from the cervical os. No abnormal vaginal discharge appreciated. pap test obtained, pt tolerated procedure well. STI vaginal swabs obtained, pt tolerated procedure well.     Pelvic examination performed by Samina Galindo MD in the presence of a chaperone.     Assessment/Plan:     Dirk Issa is a 44 y.o. year old female who presents to clinic for follow up on prolonged menstrual bleeding.    1. IUD check up  - Strings not visualized on my exam, possibly coiled   - However, POCUS in office today confirmed placement - copper IUD still in place and visualized    2. Menorrhagia with regular cycle  - Discussed with patient given her symptoms resolved, that is re-assuring. Does not seem like AUB at this time. Suspect that the cause could be due to the copper IUD itself. However, if occurs again will need to discuss alternative birth control whether it be another IUD hormonal vs BTL vs other option. Gave patient a handout with birth control options. She states she will think about it. I told her we have time to discuss options as long as she does not have the prolonged bleeding again. Otherwise, she should return again as we will likely need to remove IUD to see if it is the cause. Otherwise, discussed the copper IUD can technically stay in for 12 years if not having issues.  - Pap smear and swabs collected today    Follow-up: 1 month or sooner if needed    A total of 45 minutes was spent on patient care during this encounter which included chart review, examining the patient, formulating a treatment plan and documentation.     Medical decision making straight forward and not complex during this visit.     Case discussed with staff:   Ave Galindo MD  [facility], PGY-2  05/15/2024

## 2024-05-20 NOTE — PROGRESS NOTES
I have reviewed the notes, assessments, and/or procedures performed by Dr. Barakat, I concur with her/his documentation of Dirk Issa.  Date of Service: 5/9/2024 IUD should be removed and ongoing contraception plan reviewed

## 2024-05-24 ENCOUNTER — TELEPHONE (OUTPATIENT)
Dept: FAMILY MEDICINE | Facility: HOSPITAL | Age: 44
End: 2024-05-24
Payer: MEDICAID

## 2024-05-24 DIAGNOSIS — A59.01 TRICHOMONAS VAGINALIS (TV) INFECTION: ICD-10-CM

## 2024-05-24 DIAGNOSIS — F43.9 TRAUMA AND STRESSOR-RELATED DISORDER: ICD-10-CM

## 2024-05-24 DIAGNOSIS — B96.89 BACTERIAL VAGINOSIS: Primary | ICD-10-CM

## 2024-05-24 DIAGNOSIS — N76.0 BACTERIAL VAGINOSIS: Primary | ICD-10-CM

## 2024-05-24 RX ORDER — METRONIDAZOLE 500 MG/1
500 TABLET ORAL EVERY 12 HOURS
Qty: 14 TABLET | Refills: 0 | Status: SHIPPED | OUTPATIENT
Start: 2024-05-24 | End: 2024-05-31

## 2024-05-24 NOTE — TELEPHONE ENCOUNTER
Called patient to discuss the results of her pap smear and swabs +BV and trichomonas. Patient states she is unsure how she got it as she has been using condoms all the time with her current partner. Advised her that unclear how long she has had the infection for. She denies any symptoms of vaginal discharge or bleeding. Discussed will send her Flagyl BID x 7 days to take for treatment of both. Advised her partner to get treated as well. Discussed risks and benefits of medication and common side effects. All questions answered. Patient verbalized understanding.    Patient also requesting to speak to a counselor or therapist at this time. She states she has been going through a lot of family situation and trauma in the past and would like to see if she can get some assistance through it. Discussed with her can send her a list of Medicaid providers or refer her to our psychologist in house Dr. Mccall. She states familiarity with our clinic for years and like to see Dr. Mccall. Will place referral and send message to Dr. Mccall to schedule patient.    Samina Galindo MD  [facility], PGY-2  05/24/2024

## 2024-05-29 ENCOUNTER — PATIENT MESSAGE (OUTPATIENT)
Dept: HEPATOLOGY | Facility: HOSPITAL | Age: 44
End: 2024-05-29
Payer: MEDICAID

## 2024-05-29 ENCOUNTER — TELEPHONE (OUTPATIENT)
Dept: HEPATOLOGY | Facility: HOSPITAL | Age: 44
End: 2024-05-29
Payer: MEDICAID

## 2024-05-29 NOTE — TELEPHONE ENCOUNTER
Practitioner called patient for consultation after receiving a message on patient's behalf. Practitioner scheduled patient's appointment

## 2024-06-05 NOTE — PROGRESS NOTES
I assume primary medical responsibility for this patient. I have reviewed the history, physical, and assessement & treatment plan with the resident and agree that the care is reasonable and necessary. This service has been performed by a resident with the presence of a teaching physician for the key parts of the history/exam. If necessary, an addendum of additional findings or evaluation beyond the resident documentation will be noted below.     Sharon Dorado MD

## 2024-06-12 ENCOUNTER — TELEPHONE (OUTPATIENT)
Dept: HEPATOLOGY | Facility: HOSPITAL | Age: 44
End: 2024-06-12
Payer: MEDICAID

## 2024-08-20 DIAGNOSIS — I10 ESSENTIAL HYPERTENSION: ICD-10-CM

## 2024-08-20 RX ORDER — ATORVASTATIN CALCIUM 20 MG/1
20 TABLET, FILM COATED ORAL DAILY
Qty: 90 TABLET | Refills: 3 | Status: SHIPPED | OUTPATIENT
Start: 2024-08-20 | End: 2025-08-20

## 2024-08-20 RX ORDER — LISINOPRIL 20 MG/1
20 TABLET ORAL DAILY
Qty: 90 TABLET | Refills: 3 | Status: SHIPPED | OUTPATIENT
Start: 2024-08-20

## 2024-08-20 RX ORDER — HYDROCHLOROTHIAZIDE 25 MG/1
25 TABLET ORAL DAILY
Qty: 90 TABLET | Refills: 3 | Status: SHIPPED | OUTPATIENT
Start: 2024-08-20 | End: 2025-08-20

## 2024-08-20 RX ORDER — AMLODIPINE BESYLATE 10 MG/1
10 TABLET ORAL DAILY
Qty: 90 TABLET | Refills: 3 | Status: SHIPPED | OUTPATIENT
Start: 2024-08-20 | End: 2025-08-20

## 2025-07-23 ENCOUNTER — TELEPHONE (OUTPATIENT)
Dept: OBSTETRICS AND GYNECOLOGY | Facility: CLINIC | Age: 45
End: 2025-07-23
Payer: MEDICAID